# Patient Record
Sex: FEMALE | Race: WHITE | NOT HISPANIC OR LATINO | Employment: FULL TIME | URBAN - METROPOLITAN AREA
[De-identification: names, ages, dates, MRNs, and addresses within clinical notes are randomized per-mention and may not be internally consistent; named-entity substitution may affect disease eponyms.]

---

## 2018-08-28 ENCOUNTER — LAB (OUTPATIENT)
Dept: LAB | Facility: HOSPITAL | Age: 28
End: 2018-08-28

## 2018-08-28 ENCOUNTER — TRANSCRIBE ORDERS (OUTPATIENT)
Dept: LAB | Facility: HOSPITAL | Age: 28
End: 2018-08-28

## 2018-08-28 DIAGNOSIS — O03.9 THREATENED ABORTION, DELIVERED: ICD-10-CM

## 2018-08-28 DIAGNOSIS — O03.9 THREATENED ABORTION, DELIVERED: Primary | ICD-10-CM

## 2018-08-28 LAB
ABO GROUP BLD: NORMAL
BLD GP AB SCN SERPL QL: NEGATIVE
DEPRECATED RDW RBC AUTO: 37.4 FL (ref 37–54)
ERYTHROCYTE [DISTWIDTH] IN BLOOD BY AUTOMATED COUNT: 12.6 % (ref 11.3–14.5)
HCG INTACT+B SERPL-ACNC: NORMAL MIU/ML
HCT VFR BLD AUTO: 37.6 % (ref 34.5–44)
HGB BLD-MCNC: 12.9 G/DL (ref 11.5–15.5)
MCH RBC QN AUTO: 28.2 PG (ref 27–31)
MCHC RBC AUTO-ENTMCNC: 34.3 G/DL (ref 32–36)
MCV RBC AUTO: 82.1 FL (ref 80–99)
PLATELET # BLD AUTO: 231 10*3/MM3 (ref 150–450)
PMV BLD AUTO: 9.5 FL (ref 6–12)
RBC # BLD AUTO: 4.58 10*6/MM3 (ref 3.89–5.14)
RH BLD: NEGATIVE
WBC NRBC COR # BLD: 7.7 10*3/MM3 (ref 3.5–10.8)

## 2018-08-28 PROCEDURE — 86900 BLOOD TYPING SEROLOGIC ABO: CPT

## 2018-08-28 PROCEDURE — 36415 COLL VENOUS BLD VENIPUNCTURE: CPT

## 2018-08-28 PROCEDURE — 84702 CHORIONIC GONADOTROPIN TEST: CPT

## 2018-08-28 PROCEDURE — 85027 COMPLETE CBC AUTOMATED: CPT

## 2018-08-28 PROCEDURE — 86901 BLOOD TYPING SEROLOGIC RH(D): CPT

## 2018-08-28 PROCEDURE — 86850 RBC ANTIBODY SCREEN: CPT

## 2018-09-14 ENCOUNTER — TRANSCRIBE ORDERS (OUTPATIENT)
Dept: LAB | Facility: HOSPITAL | Age: 28
End: 2018-09-14

## 2018-09-14 ENCOUNTER — LAB (OUTPATIENT)
Dept: LAB | Facility: HOSPITAL | Age: 28
End: 2018-09-14

## 2018-09-14 DIAGNOSIS — Z3A.08 8 WEEKS GESTATION OF PREGNANCY: ICD-10-CM

## 2018-09-14 DIAGNOSIS — Z3A.08 8 WEEKS GESTATION OF PREGNANCY: Primary | ICD-10-CM

## 2018-09-14 DIAGNOSIS — Z34.81 PRENATAL CARE, SUBSEQUENT PREGNANCY, FIRST TRIMESTER: ICD-10-CM

## 2018-09-14 LAB
ABO GROUP BLD: NORMAL
BASOPHILS # BLD AUTO: 0.01 10*3/MM3 (ref 0–0.2)
BASOPHILS NFR BLD AUTO: 0.2 % (ref 0–1)
BLD GP AB SCN SERPL QL: NEGATIVE
DEPRECATED RDW RBC AUTO: 37.8 FL (ref 37–54)
EOSINOPHIL # BLD AUTO: 0.04 10*3/MM3 (ref 0–0.3)
EOSINOPHIL NFR BLD AUTO: 0.6 % (ref 0–3)
ERYTHROCYTE [DISTWIDTH] IN BLOOD BY AUTOMATED COUNT: 12.6 % (ref 11.3–14.5)
GLUCOSE BLD-MCNC: 66 MG/DL (ref 70–100)
HBV SURFACE AG SERPL QL IA: NORMAL
HCT VFR BLD AUTO: 38.5 % (ref 34.5–44)
HCV AB SER DONR QL: NORMAL
HGB BLD-MCNC: 13.2 G/DL (ref 11.5–15.5)
HIV1+2 AB SER QL: NORMAL
IMM GRANULOCYTES # BLD: 0.01 10*3/MM3 (ref 0–0.03)
IMM GRANULOCYTES NFR BLD: 0.2 % (ref 0–0.6)
LYMPHOCYTES # BLD AUTO: 1.57 10*3/MM3 (ref 0.6–4.8)
LYMPHOCYTES NFR BLD AUTO: 24.1 % (ref 24–44)
MCH RBC QN AUTO: 28.4 PG (ref 27–31)
MCHC RBC AUTO-ENTMCNC: 34.3 G/DL (ref 32–36)
MCV RBC AUTO: 83 FL (ref 80–99)
MONOCYTES # BLD AUTO: 0.58 10*3/MM3 (ref 0–1)
MONOCYTES NFR BLD AUTO: 8.9 % (ref 0–12)
NEUTROPHILS # BLD AUTO: 4.32 10*3/MM3 (ref 1.5–8.3)
NEUTROPHILS NFR BLD AUTO: 66.2 % (ref 41–71)
PLATELET # BLD AUTO: 222 10*3/MM3 (ref 150–450)
PMV BLD AUTO: 10 FL (ref 6–12)
RBC # BLD AUTO: 4.64 10*6/MM3 (ref 3.89–5.14)
RH BLD: NEGATIVE
RUBV IGG SER QL: NORMAL
RUBV IGG SER-ACNC: 16.6 IU/ML
T4 FREE SERPL-MCNC: 1.23 NG/DL (ref 0.89–1.76)
TSH SERPL DL<=0.05 MIU/L-ACNC: 0.12 MIU/ML (ref 0.35–5.35)
WBC NRBC COR # BLD: 6.52 10*3/MM3 (ref 3.5–10.8)

## 2018-09-14 PROCEDURE — 84439 ASSAY OF FREE THYROXINE: CPT

## 2018-09-14 PROCEDURE — 86803 HEPATITIS C AB TEST: CPT

## 2018-09-14 PROCEDURE — 85025 COMPLETE CBC W/AUTO DIFF WBC: CPT

## 2018-09-14 PROCEDURE — 86850 RBC ANTIBODY SCREEN: CPT

## 2018-09-14 PROCEDURE — 87340 HEPATITIS B SURFACE AG IA: CPT

## 2018-09-14 PROCEDURE — 82947 ASSAY GLUCOSE BLOOD QUANT: CPT

## 2018-09-14 PROCEDURE — 86900 BLOOD TYPING SEROLOGIC ABO: CPT

## 2018-09-14 PROCEDURE — 80081 OBSTETRIC PANEL INC HIV TSTG: CPT

## 2018-09-14 PROCEDURE — 86762 RUBELLA ANTIBODY: CPT

## 2018-09-14 PROCEDURE — G0432 EIA HIV-1/HIV-2 SCREEN: HCPCS

## 2018-09-14 PROCEDURE — 84443 ASSAY THYROID STIM HORMONE: CPT

## 2018-09-14 PROCEDURE — 86901 BLOOD TYPING SEROLOGIC RH(D): CPT

## 2018-09-14 PROCEDURE — 36415 COLL VENOUS BLD VENIPUNCTURE: CPT

## 2018-09-17 LAB — RPR SER QL: NORMAL

## 2018-11-09 ENCOUNTER — TRANSCRIBE ORDERS (OUTPATIENT)
Dept: LAB | Facility: HOSPITAL | Age: 28
End: 2018-11-09

## 2018-11-09 ENCOUNTER — LAB (OUTPATIENT)
Dept: LAB | Facility: HOSPITAL | Age: 28
End: 2018-11-09

## 2018-11-09 DIAGNOSIS — Z3A.17 17 WEEKS GESTATION OF PREGNANCY: Primary | ICD-10-CM

## 2018-11-09 DIAGNOSIS — Z3A.17 17 WEEKS GESTATION OF PREGNANCY: ICD-10-CM

## 2018-11-09 LAB — TSH SERPL DL<=0.05 MIU/L-ACNC: 1.08 MIU/ML (ref 0.35–5.35)

## 2018-11-09 PROCEDURE — 84443 ASSAY THYROID STIM HORMONE: CPT

## 2018-11-09 PROCEDURE — 36415 COLL VENOUS BLD VENIPUNCTURE: CPT

## 2019-02-01 ENCOUNTER — TRANSCRIBE ORDERS (OUTPATIENT)
Dept: LAB | Facility: HOSPITAL | Age: 29
End: 2019-02-01

## 2019-02-01 ENCOUNTER — LAB (OUTPATIENT)
Dept: LAB | Facility: HOSPITAL | Age: 29
End: 2019-02-01

## 2019-02-01 DIAGNOSIS — Z34.83 PRENATAL CARE, SUBSEQUENT PREGNANCY, THIRD TRIMESTER: ICD-10-CM

## 2019-02-01 DIAGNOSIS — Z3A.28 28 WEEKS GESTATION OF PREGNANCY: Primary | ICD-10-CM

## 2019-02-01 DIAGNOSIS — Z3A.28 28 WEEKS GESTATION OF PREGNANCY: ICD-10-CM

## 2019-02-01 LAB
ABO GROUP BLD: NORMAL
BLD GP AB SCN SERPL QL: NEGATIVE
DEPRECATED RDW RBC AUTO: 43.6 FL (ref 37–54)
ERYTHROCYTE [DISTWIDTH] IN BLOOD BY AUTOMATED COUNT: 13.5 % (ref 11.3–14.5)
EXTERNAL GLUCOSE TOLERANCE TEST FASTING: 96 MG/DL
GLUCOSE 1H P 100 G GLC PO SERPL-MCNC: 96 MG/DL (ref 65–140)
HCT VFR BLD AUTO: 38.1 % (ref 34.5–44)
HGB BLD-MCNC: 12.9 G/DL (ref 11.5–15.5)
MCH RBC QN AUTO: 30.1 PG (ref 27–31)
MCHC RBC AUTO-ENTMCNC: 33.9 G/DL (ref 32–36)
MCV RBC AUTO: 89 FL (ref 80–99)
PLATELET # BLD AUTO: 195 10*3/MM3 (ref 150–450)
PMV BLD AUTO: 9.8 FL (ref 6–12)
RBC # BLD AUTO: 4.28 10*6/MM3 (ref 3.89–5.14)
RH BLD: NEGATIVE
WBC NRBC COR # BLD: 8.55 10*3/MM3 (ref 3.5–10.8)

## 2019-02-01 PROCEDURE — 86850 RBC ANTIBODY SCREEN: CPT

## 2019-02-01 PROCEDURE — 85027 COMPLETE CBC AUTOMATED: CPT

## 2019-02-01 PROCEDURE — 86900 BLOOD TYPING SEROLOGIC ABO: CPT

## 2019-02-01 PROCEDURE — 82950 GLUCOSE TEST: CPT | Performed by: NURSE PRACTITIONER

## 2019-02-01 PROCEDURE — 86901 BLOOD TYPING SEROLOGIC RH(D): CPT

## 2019-02-01 PROCEDURE — 36415 COLL VENOUS BLD VENIPUNCTURE: CPT | Performed by: NURSE PRACTITIONER

## 2019-04-18 ENCOUNTER — HOSPITAL ENCOUNTER (INPATIENT)
Facility: HOSPITAL | Age: 29
LOS: 3 days | Discharge: HOME | End: 2019-04-21
Attending: NURSE PRACTITIONER | Admitting: OBSTETRICS & GYNECOLOGY

## 2019-04-18 PROBLEM — Z3A.39 PREGNANCY WITH 39 COMPLETED WEEKS GESTATION: Status: ACTIVE | Noted: 2019-04-18

## 2019-04-18 LAB
ABO GROUP BLD: NORMAL
BLD GP AB SCN SERPL QL: NEGATIVE
DEPRECATED RDW RBC AUTO: 38.7 FL (ref 37–54)
ERYTHROCYTE [DISTWIDTH] IN BLOOD BY AUTOMATED COUNT: 13 % (ref 12.3–15.4)
HCT VFR BLD AUTO: 36.1 % (ref 34–46.6)
HGB BLD-MCNC: 12.1 G/DL (ref 12–15.9)
MCH RBC QN AUTO: 28.3 PG (ref 26.6–33)
MCHC RBC AUTO-ENTMCNC: 33.5 G/DL (ref 31.5–35.7)
MCV RBC AUTO: 84.5 FL (ref 79–97)
PLATELET # BLD AUTO: 210 10*3/MM3 (ref 140–450)
PMV BLD AUTO: 10.4 FL (ref 6–12)
RBC # BLD AUTO: 4.27 10*6/MM3 (ref 3.77–5.28)
RH BLD: NEGATIVE
T&S EXPIRATION DATE: NORMAL
WBC NRBC COR # BLD: 9.99 10*3/MM3 (ref 3.4–10.8)

## 2019-04-18 PROCEDURE — 86901 BLOOD TYPING SEROLOGIC RH(D): CPT | Performed by: ADVANCED PRACTICE MIDWIFE

## 2019-04-18 PROCEDURE — 85027 COMPLETE CBC AUTOMATED: CPT | Performed by: ADVANCED PRACTICE MIDWIFE

## 2019-04-18 PROCEDURE — 86900 BLOOD TYPING SEROLOGIC ABO: CPT | Performed by: ADVANCED PRACTICE MIDWIFE

## 2019-04-18 PROCEDURE — 59025 FETAL NON-STRESS TEST: CPT

## 2019-04-18 PROCEDURE — 86850 RBC ANTIBODY SCREEN: CPT | Performed by: ADVANCED PRACTICE MIDWIFE

## 2019-04-18 RX ORDER — OXYTOCIN-SODIUM CHLORIDE 0.9% IV SOLN 30 UNIT/500ML 30-0.9/5 UT/ML-%
2-30 SOLUTION INTRAVENOUS
Status: DISCONTINUED | OUTPATIENT
Start: 2019-04-18 | End: 2019-04-19 | Stop reason: HOSPADM

## 2019-04-18 RX ORDER — MAGNESIUM CARB/ALUMINUM HYDROX 105-160MG
30 TABLET,CHEWABLE ORAL ONCE
Status: COMPLETED | OUTPATIENT
Start: 2019-04-18 | End: 2019-04-19

## 2019-04-18 RX ORDER — SODIUM CHLORIDE 0.9 % (FLUSH) 0.9 %
1-10 SYRINGE (ML) INJECTION AS NEEDED
Status: DISCONTINUED | OUTPATIENT
Start: 2019-04-18 | End: 2019-04-19 | Stop reason: HOSPADM

## 2019-04-18 RX ORDER — BUTORPHANOL TARTRATE 1 MG/ML
1 INJECTION, SOLUTION INTRAMUSCULAR; INTRAVENOUS
Status: DISCONTINUED | OUTPATIENT
Start: 2019-04-18 | End: 2019-04-19 | Stop reason: HOSPADM

## 2019-04-18 RX ORDER — SODIUM CHLORIDE 0.9 % (FLUSH) 0.9 %
3 SYRINGE (ML) INJECTION EVERY 12 HOURS SCHEDULED
Status: DISCONTINUED | OUTPATIENT
Start: 2019-04-18 | End: 2019-04-19 | Stop reason: HOSPADM

## 2019-04-18 RX ORDER — SODIUM CHLORIDE, SODIUM LACTATE, POTASSIUM CHLORIDE, CALCIUM CHLORIDE 600; 310; 30; 20 MG/100ML; MG/100ML; MG/100ML; MG/100ML
125 INJECTION, SOLUTION INTRAVENOUS CONTINUOUS
Status: DISCONTINUED | OUTPATIENT
Start: 2019-04-18 | End: 2019-04-19

## 2019-04-18 RX ORDER — LIDOCAINE HYDROCHLORIDE 10 MG/ML
5 INJECTION, SOLUTION EPIDURAL; INFILTRATION; INTRACAUDAL; PERINEURAL AS NEEDED
Status: DISCONTINUED | OUTPATIENT
Start: 2019-04-18 | End: 2019-04-19 | Stop reason: HOSPADM

## 2019-04-18 NOTE — H&P
[facility]  Obstetric History and Physical    Chief Complaint   Patient presents with   • Contractions       Subjective     Patient is a 28 y.o. female  currently at 39w4d, who presents with reports of painful contractions. Contractions started about 1130 and became regular and uncomfortable around 1430.  Denies ROM or vaginal bleeding.  Baby active.  Complains of suprapubic pressure.     Her prenatal care is benign.  Her previous obstetric/gynecological history is noted for previous uncomplicated vaginal delivery.    The following portions of the patients history were reviewed and updated as appropriate: current medications, allergies, past medical history, past surgical history, past family history, past social history and problem list .       Prenatal Information:   External Prenatal Results     Pregnancy Outside Results - Transcribed From Office Records - See Scanned Records For Details     Test Value Date Time    Hgb 12.9 g/dL 19 1008    Hct 38.1 % 19 1008    ABO A  19 1008    Rh Negative  19 1008    Antibody Screen Negative  19 1008    Glucose Fasting GTT       Glucose Tolerance Test 1 hour 96      Glucose Tolerance Test 3 hour       Gonorrhea (discrete)       Chlamydia (discrete)       RPR Non-Reactive  18 1203    VDRL       Syphilis Antibody       Rubella 16.6 IU/mL 18 1203      Immune  18 1203    HBsAg Non-Reactive  18 1203    Herpes Simplex Virus PCR       Herpes Simplex VIrus Culture       HIV Non-Reactive  18 1203    Hep C RNA Quant PCR       Hep C Antibody Non-Reactive  18 1203    AFP       Group B Strep       GBS Susceptibility to Clindamycin       GBS Susceptibility to Erythromycin       Fetal Fibronectin       Genetic Testing, Maternal Blood             Drug Screening     Test Value Date Time    Urine Drug Screen       Amphetamine Screen       Barbiturate Screen       Benzodiazepine Screen       Methadone Screen        Phencyclidine Screen       Opiates Screen       THC Screen       Cocaine Screen       Propoxyphene Screen       Buprenorphine Screen       Methamphetamine Screen       Oxycodone Screen       Tricyclic Antidepressants Screen                     Past OB History:       Obstetric History       T0      L1     SAB0   TAB0   Ectopic0   Molar0   Multiple0   Live Births0       # Outcome Date GA Lbr Rikki/2nd Weight Sex Delivery Anes PTL Lv   2 Current            1 Para                   Past Medical History: Past Medical History:   Diagnosis Date   • Anxiety    • Hypoglycemia       Past Surgical History Past Surgical History:   Procedure Laterality Date   • CYST REMOVAL     • WISDOM TOOTH EXTRACTION        Family History: History reviewed. No pertinent family history.   Social History:  reports that she has never smoked. She has never used smokeless tobacco.   reports that she does not drink alcohol.   reports that she does not use drugs.        General ROS: Pertinent items are noted in HPI, all other systems reviewed and negative    Objective     Vitals:    19 1540   BP: 120/68   Pulse: 77   Resp: 18   Temp: 98.1 °F (36.7 °C)     Weight: Weight:  [76.2 kg (168 lb)] 76.2 kg (168 lb)     Physical Examination:   General Appearance: alert, well appearing, in no apparent distress, in mild to moderate distress  Lungs: clear to auscultation, no wheezes, rales or rhonchi, symmetric air entry  Heart: regular rate and rhythm, no murmurs  Abdomen: Gravid uterus, Soft between contractions, size consistent with dates, +FM.  Back exam: no CVA tenderness   Extremities: no redness or tenderness in the calves or thighs, no edema  Skin: normal coloration and turgor, no rashes      Presentation: Vertex   Cervix: Exam by: Method: sterile exam per Muriel Ely CNM   Dilation: Cervical Dilation (cm): 3cm  Mid to posterior.   Effacement: Cervical Effacement: 80%   Station:  Membranes: Fetal Station: -1  Bulging          Fetal  Heart Rate Assessment   Method: Fetal HR Assessment Method: external   Beats/min: Fetal HR (beats/min): 135   Baseline: Fetal Heart Baseline Rate: normal range   Varibility: Fetal HR Variability: moderate (amplitude range 6 to 25 bpm)   Accels: Fetal HR Accelerations: greater than/equal to 15 bpm, lasting at least 15 seconds   Decels: Fetal HR Decelerations: absent   Tracing Category:       Uterine Assessment   Method: Method: external tocotransducer   Frequency (min): Contraction Frequency (Minutes): 2-2.5 minutes   Ctx Count in 10 min:     Duration:  60-90 seconds   Intensity:  palpate moderate to strong   Intensity by IUPC:     Resting Tone:  palpates soft   Resting Tone by IUPC:     [place] Units:       Laboratory Results: Pending          Pregnancy with 39 completed weeks gestation    Normal labor and delivery        Assessment:  1.  Intrauterine pregnancy at 39w4d weeks gestation with reactive, reassuring fetal status.    2.  labor  without ROM  3.  Obstetrical history significant for previous uncomplicated vaginal delivery at term.  4.  GBS status: Negative    Plan:  1. fetal and uterine monitoring  intermittent, expectant management and analgesia with  parenteral narcotics and epidural if requested  2. Plan of care has been reviewed with patient and partner  3. All questions have been answered.      Muriel Eyl CNM  4/18/2019  5:32 PM

## 2019-04-19 PROBLEM — Z3A.39 PREGNANCY WITH 39 COMPLETED WEEKS GESTATION: Status: RESOLVED | Noted: 2019-04-18 | Resolved: 2019-04-19

## 2019-04-19 LAB
ABO GROUP BLD: NORMAL
FETAL BLEED: NEGATIVE
NUMBER OF DOSES: NORMAL
RH BLD: NEGATIVE

## 2019-04-19 PROCEDURE — 25010000002 RHO D IMMUNE GLOBULIN 1500 UNIT/2ML SOLUTION PREFILLED SYRINGE: Performed by: NURSE PRACTITIONER

## 2019-04-19 PROCEDURE — 86900 BLOOD TYPING SEROLOGIC ABO: CPT | Performed by: NURSE PRACTITIONER

## 2019-04-19 PROCEDURE — 4A1HX4Z MONITORING OF PRODUCTS OF CONCEPTION, CARDIAC ELECTRICAL ACTIVITY, EXTERNAL APPROACH: ICD-10-PCS | Performed by: NURSE PRACTITIONER

## 2019-04-19 PROCEDURE — 85461 HEMOGLOBIN FETAL: CPT | Performed by: NURSE PRACTITIONER

## 2019-04-19 PROCEDURE — 86901 BLOOD TYPING SEROLOGIC RH(D): CPT | Performed by: NURSE PRACTITIONER

## 2019-04-19 PROCEDURE — 59025 FETAL NON-STRESS TEST: CPT

## 2019-04-19 PROCEDURE — 10907ZC DRAINAGE OF AMNIOTIC FLUID, THERAPEUTIC FROM PRODUCTS OF CONCEPTION, VIA NATURAL OR ARTIFICIAL OPENING: ICD-10-PCS | Performed by: NURSE PRACTITIONER

## 2019-04-19 RX ORDER — MISOPROSTOL 200 UG/1
800 TABLET ORAL AS NEEDED
Status: DISCONTINUED | OUTPATIENT
Start: 2019-04-19 | End: 2019-04-19 | Stop reason: HOSPADM

## 2019-04-19 RX ORDER — IBUPROFEN 600 MG/1
600 TABLET ORAL EVERY 6 HOURS PRN
Status: DISCONTINUED | OUTPATIENT
Start: 2019-04-19 | End: 2019-04-19 | Stop reason: HOSPADM

## 2019-04-19 RX ORDER — LANOLIN 100 %
OINTMENT (GRAM) TOPICAL
Status: DISCONTINUED | OUTPATIENT
Start: 2019-04-19 | End: 2019-04-21 | Stop reason: HOSPADM

## 2019-04-19 RX ORDER — OXYTOCIN-SODIUM CHLORIDE 0.9% IV SOLN 30 UNIT/500ML 30-0.9/5 UT/ML-%
85 SOLUTION INTRAVENOUS ONCE
Status: DISCONTINUED | OUTPATIENT
Start: 2019-04-19 | End: 2019-04-19 | Stop reason: HOSPADM

## 2019-04-19 RX ORDER — IBUPROFEN 600 MG/1
600 TABLET ORAL EVERY 6 HOURS PRN
Status: DISCONTINUED | OUTPATIENT
Start: 2019-04-19 | End: 2019-04-21 | Stop reason: HOSPADM

## 2019-04-19 RX ORDER — CARBOPROST TROMETHAMINE 250 UG/ML
250 INJECTION, SOLUTION INTRAMUSCULAR AS NEEDED
Status: DISCONTINUED | OUTPATIENT
Start: 2019-04-19 | End: 2019-04-19 | Stop reason: HOSPADM

## 2019-04-19 RX ORDER — OXYTOCIN-SODIUM CHLORIDE 0.9% IV SOLN 30 UNIT/500ML 30-0.9/5 UT/ML-%
650 SOLUTION INTRAVENOUS ONCE
Status: DISCONTINUED | OUTPATIENT
Start: 2019-04-19 | End: 2019-04-19 | Stop reason: HOSPADM

## 2019-04-19 RX ORDER — PRENATAL VIT/IRON FUM/FOLIC AC 27MG-0.8MG
1 TABLET ORAL DAILY
Status: DISCONTINUED | OUTPATIENT
Start: 2019-04-19 | End: 2019-04-21 | Stop reason: HOSPADM

## 2019-04-19 RX ORDER — DOCUSATE SODIUM 100 MG/1
100 CAPSULE, LIQUID FILLED ORAL 2 TIMES DAILY
Status: DISCONTINUED | OUTPATIENT
Start: 2019-04-19 | End: 2019-04-21 | Stop reason: HOSPADM

## 2019-04-19 RX ORDER — METHYLERGONOVINE MALEATE 0.2 MG/ML
200 INJECTION INTRAVENOUS ONCE AS NEEDED
Status: DISCONTINUED | OUTPATIENT
Start: 2019-04-19 | End: 2019-04-19 | Stop reason: HOSPADM

## 2019-04-19 RX ADMIN — IBUPROFEN 600 MG: 600 TABLET ORAL at 09:01

## 2019-04-19 RX ADMIN — PRENATAL VIT W/ FE FUMARATE-FA TAB 27-0.8 MG 1 TABLET: 27-0.8 TAB at 09:01

## 2019-04-19 RX ADMIN — DOCUSATE SODIUM 100 MG: 100 CAPSULE, LIQUID FILLED ORAL at 20:58

## 2019-04-19 RX ADMIN — IBUPROFEN 600 MG: 600 TABLET, FILM COATED ORAL at 02:12

## 2019-04-19 RX ADMIN — DOCUSATE SODIUM 100 MG: 100 CAPSULE, LIQUID FILLED ORAL at 08:19

## 2019-04-19 RX ADMIN — HUMAN RHO(D) IMMUNE GLOBULIN 1500 UNITS: 1500 SOLUTION INTRAMUSCULAR; INTRAVENOUS at 13:51

## 2019-04-19 RX ADMIN — IBUPROFEN 600 MG: 600 TABLET ORAL at 20:58

## 2019-04-19 RX ADMIN — MINERAL OIL 30 ML: 471.95 OIL ORAL at 01:35

## 2019-04-19 NOTE — LACTATION NOTE
04/19/19 0845   Maternal Information   Person Making Referral other (see comments)  (Courtesy visit, Teaching done)   Equipment Type   Breast Pump Type other (see comments)  (Gave Rx and instructed how to get breast pump.)

## 2019-04-19 NOTE — L&D DELIVERY NOTE
[facility]  Vaginal Delivery Note    Delivery     Delivery: Vaginal, Spontaneous     YOB: 2019    Time of Birth:  Gestational Age 1:45 AM   39w5d     Anesthesia: None     Delivering clinician: Muriel Ely    Forceps?   No   Vacuum? No    Shoulder dystocia present: No        Delivery narrative:  Quita pushed effectively to spontaneous vaginal delivery of a viable female infant in occiput anterior, over intact perineum. Mouth and nose bulb suctioned after delivery of head. Shoulder and body delivered easily with next push.  Spontaneous cry.  Infant placed on mothers abdomen and dried.  Cord was double clamped at 2 minutes of life and cut by FOB.  Cord blood and cord segment obtained.  Spontaneous delivery of intact placenta with 3 vessel cord. Fundus firm. Lochia rubra scant. Perineal inspect reveal a small introital abrasion with hemostasis.  Mother and daughter stable in the immediate PP period.     Infant    Findings: female  infant     Infant observations: Weight: Not available at this time  Length:    in  Observations/Comments:         Apgars: 9   @ 1 minute /    9   @ 5 minutes   Infant Name: Lisbeth     Placenta, Cord, and Fluid    Placenta delivered  Spontaneous  at   4/19/2019  1:50 AM     Cord: 3 vessels  present.   Nuchal Cord?  no   Cord blood obtained: Yes    Cord gases obtained:  No        Repair    Episiotomy: None    Lacerations: No   Estimated Blood Loss:  100mls           Complications  none    Disposition  Mother to Mother Baby/Postpartum  in stable condition currently.  Baby to remains with mom  in stable condition currently.      Muriel Ely CNM  04/19/19  2:02 AM

## 2019-04-19 NOTE — PROGRESS NOTES
"04/18/19  9:04 PM  Quita Millan      ASSESSMENTS: Coping well with contractions. Requesting amniotomy.    /61 (BP Location: Right arm, Patient Position: Lying)   Pulse 75   Temp 98.2 °F (36.8 °C) (Oral)   Resp 18   Ht 165.1 cm (65\")   Wt 76.2 kg (168 lb)   Breastfeeding? Yes   BMI 27.96 kg/m²     Fetal Heart Rate Assessment   Method: Fetal HR Assessment Method: external   Beats/min: Fetal HR (beats/min): 135   Baseline: Fetal Heart Baseline Rate: normal range   Varibility: Fetal HR Variability: moderate (amplitude range 6 to 25 bpm)   Accels: Fetal HR Accelerations: greater than/equal to 15 bpm, lasting at least 15 seconds   Decels: Fetal HR Decelerations: absent   Tracing Category: Fetal HR Tracing Category: (off for bathroom)     Uterine Assessment   Method: Method: external tocotransducer   Frequency (min): Contraction Frequency (Minutes): 3-4   Ctx Count in 10 min:     Duration:     Intensity:     Intensity by IUPC:     Resting Tone:     Resting Tone by IUPC:     [place] Units:                                Presentation: Vertex   Cervix: Exam by: Method: sterile exam per GUANAKITO(Muriel Ely)   Dilation: Cervical Dilation (cm): 4   Effacement: Cervical Effacement: 80%   Station:  Membranes: Fetal Station: -2   AROM fluid clear            Lab Results   Component Value Date    WBC 9.99 04/18/2019    HGB 12.1 04/18/2019    HCT 36.1 04/18/2019    MCV 84.5 04/18/2019     04/18/2019    AST 22 06/28/2016    ALT 20 06/28/2016     Results from last 7 days   Lab Units 04/18/19  1746   ABO TYPING  A   RH TYPING  Negative   ANTIBODY SCREEN  Negative       PLAN: Continue present management.    Muriel Ely CNM  9:04 PM  04/18/19  "

## 2019-04-19 NOTE — PLAN OF CARE
Problem: Patient Care Overview  Goal: Plan of Care Review  Outcome: Ongoing (interventions implemented as appropriate)   04/19/19 0647   Coping/Psychosocial   Plan of Care Reviewed With patient   Plan of Care Review   Progress improving   OTHER   Outcome Summary VSS, lochia rubra lt. amt. check void.     Goal: Individualization and Mutuality  Outcome: Ongoing (interventions implemented as appropriate)    Goal: Discharge Needs Assessment  Outcome: Ongoing (interventions implemented as appropriate)   04/19/19 0647   Discharge Needs Assessment   Patient/Family Anticipates Transition to home with family     Goal: Interprofessional Rounds/Family Conf  Outcome: Ongoing (interventions implemented as appropriate)   04/19/19 0647   Interdisciplinary Rounds/Family Conf   Participants family;nursing;physician       Problem: Labor (Cervical Ripen, Induct, Augment) (Adult,Obstetrics,Pediatric)  Goal: Signs and Symptoms of Listed Potential Problems Will be Absent, Minimized or Managed (Labor)  Outcome: Outcome(s) achieved Date Met: 04/19/19      Problem: Postpartum (Vaginal Delivery) (Adult,Obstetrics,Pediatric)  Goal: Signs and Symptoms of Listed Potential Problems Will be Absent, Minimized or Managed (Postpartum)  Outcome: Ongoing (interventions implemented as appropriate)   04/19/19 0647   Goal/Outcome Evaluation   Problems Present (Postpartum Vag Deliv) none       Problem: Breastfeeding (Adult,Obstetrics,Pediatric)  Goal: Signs and Symptoms of Listed Potential Problems Will be Absent, Minimized or Managed (Breastfeeding)  Outcome: Ongoing (interventions implemented as appropriate)   04/19/19 0647   Goal/Outcome Evaluation   Problems Present (Breastfeeding) none

## 2019-04-19 NOTE — PLAN OF CARE
Problem: Patient Care Overview  Goal: Plan of Care Review  Outcome: Ongoing (interventions implemented as appropriate)   04/19/19 2839   Coping/Psychosocial   Plan of Care Reviewed With patient;significant other   OTHER   Outcome Summary Instructed patient to feed baby when feeding cues are seen and not let baby go longer than 3 hrs before waking baby and attempting to nurse. Gave Rx for breast pump.

## 2019-04-19 NOTE — PROGRESS NOTES
[facility]  Vaginal Delivery Progress Note    Subjective     Doing well, pain controlled, lochia less than menses      Objective     Vital Signs Range for the last 24 hours  Temperature: Temp:  [98.1 °F (36.7 °C)-98.5 °F (36.9 °C)] 98.1 °F (36.7 °C)   Temp Source: Temp src: Oral   BP: BP: (101-133)/(59-76) 107/61   Pulse: Heart Rate:  [65-94] 74   Respirations: Resp:  [16-20] 16   SPO2:     O2 Amount (l/min):     O2 Devices           Physical Exam:  General:  no acute distresss.  Abdomen: Soft, non-tender, fundus firm  Extremities: normal, atraumatic, no cyanosis, and trace edema.       Lab results reviewed:  Yes    Lab Results   Component Value Date    WBC 9.99 04/18/2019    HGB 12.1 04/18/2019    HCT 36.1 04/18/2019    MCV 84.5 04/18/2019     04/18/2019         Assessment/Plan       Normal spontaneous vaginal delivery      Quita Millan is Day 0  post-partum       Plan:  Continue current care.      Reny Casillas CNM  4/19/2019  8:51 AM

## 2019-04-20 LAB
BASOPHILS # BLD AUTO: 0.03 10*3/MM3 (ref 0–0.2)
BASOPHILS NFR BLD AUTO: 0.3 % (ref 0–1.5)
DEPRECATED RDW RBC AUTO: 41.1 FL (ref 37–54)
EOSINOPHIL # BLD AUTO: 0.2 10*3/MM3 (ref 0–0.4)
EOSINOPHIL NFR BLD AUTO: 1.9 % (ref 0.3–6.2)
ERYTHROCYTE [DISTWIDTH] IN BLOOD BY AUTOMATED COUNT: 13.2 % (ref 12.3–15.4)
HCT VFR BLD AUTO: 34.1 % (ref 34–46.6)
HGB BLD-MCNC: 11.1 G/DL (ref 12–15.9)
IMM GRANULOCYTES # BLD AUTO: 0.06 10*3/MM3 (ref 0–0.05)
IMM GRANULOCYTES NFR BLD AUTO: 0.6 % (ref 0–0.5)
LYMPHOCYTES # BLD AUTO: 2.13 10*3/MM3 (ref 0.7–3.1)
LYMPHOCYTES NFR BLD AUTO: 20.2 % (ref 19.6–45.3)
MCH RBC QN AUTO: 28 PG (ref 26.6–33)
MCHC RBC AUTO-ENTMCNC: 32.6 G/DL (ref 31.5–35.7)
MCV RBC AUTO: 86.1 FL (ref 79–97)
MONOCYTES # BLD AUTO: 0.64 10*3/MM3 (ref 0.1–0.9)
MONOCYTES NFR BLD AUTO: 6.1 % (ref 5–12)
NEUTROPHILS # BLD AUTO: 7.54 10*3/MM3 (ref 1.7–7)
NEUTROPHILS NFR BLD AUTO: 71.5 % (ref 42.7–76)
PLATELET # BLD AUTO: 173 10*3/MM3 (ref 140–450)
PMV BLD AUTO: 10.2 FL (ref 6–12)
RBC # BLD AUTO: 3.96 10*6/MM3 (ref 3.77–5.28)
WBC NRBC COR # BLD: 10.54 10*3/MM3 (ref 3.4–10.8)

## 2019-04-20 PROCEDURE — 85025 COMPLETE CBC W/AUTO DIFF WBC: CPT | Performed by: ADVANCED PRACTICE MIDWIFE

## 2019-04-20 RX ADMIN — IBUPROFEN 600 MG: 600 TABLET ORAL at 11:04

## 2019-04-20 RX ADMIN — Medication: at 07:36

## 2019-04-20 RX ADMIN — DOCUSATE SODIUM 100 MG: 100 CAPSULE, LIQUID FILLED ORAL at 08:56

## 2019-04-20 RX ADMIN — IBUPROFEN 600 MG: 600 TABLET ORAL at 18:57

## 2019-04-20 NOTE — PROGRESS NOTES
[facility]  Vaginal Delivery Progress Note    Subjective     Doing well, pain controlled, lochia less than menses      Objective     Vital Signs Range for the last 24 hours  Temperature: Temp:  [98.1 °F (36.7 °C)-98.3 °F (36.8 °C)] 98.1 °F (36.7 °C)   Temp Source: Temp src: Oral   BP: BP: (106-108)/(58-59) 106/58   Pulse: Heart Rate:  [69-75] 69   Respirations: Resp:  [16] 16   SPO2:     O2 Amount (l/min):     O2 Devices           Physical Exam:  General:  no acute distresss.  Abdomen: Soft, non-tender, fundus firm  Extremities: normal, atraumatic, no cyanosis, and trace edema.       Lab results reviewed:  Yes    Lab Results   Component Value Date    WBC 10.54 04/20/2019    HGB 11.1 (L) 04/20/2019    HCT 34.1 04/20/2019    MCV 86.1 04/20/2019     04/20/2019         Assessment/Plan       Normal spontaneous vaginal delivery      Quita Millan is Day 1  post-partum       Plan:  Continue current care.      Reny Casillas CNM  4/20/2019  10:44 AM

## 2019-04-20 NOTE — LACTATION NOTE
Mom reports some bleeding to her right nipple overnight.  No bleeding or damage noted at this time.  Shield and shells given.  Encouraged mom to call for a latch check today.

## 2019-04-20 NOTE — PLAN OF CARE
Problem: Patient Care Overview  Goal: Plan of Care Review  Outcome: Ongoing (interventions implemented as appropriate)   04/20/19 0330   Coping/Psychosocial   Plan of Care Reviewed With patient   Plan of Care Review   Progress improving   OTHER   Outcome Summary VSS, lochia WDL, voiding, pain well controlled     Goal: Individualization and Mutuality  Outcome: Ongoing (interventions implemented as appropriate)   04/20/19 0330   Individualization   Patient Specific Preferences Breastfeeding   Patient Specific Goals (Include Timeframe) Maintian pain at or below a 4 on the numeric pain scale   Patient Specific Interventions Assess pain frequently and offer interventions prn     Goal: Discharge Needs Assessment  Outcome: Ongoing (interventions implemented as appropriate)   04/20/19 0330   Discharge Needs Assessment   Concerns to be Addressed no discharge needs identified   Patient/Family Anticipates Transition to home   Patient/Family Anticipated Services at Transition none   Transportation Concerns car, none   Transportation Anticipated family or friend will provide       Problem: Postpartum (Vaginal Delivery) (Adult,Obstetrics,Pediatric)  Goal: Signs and Symptoms of Listed Potential Problems Will be Absent, Minimized or Managed (Postpartum)  Outcome: Ongoing (interventions implemented as appropriate)   04/20/19 0330   Goal/Outcome Evaluation   Problems Assessed (Postpartum Vaginal Delivery) all   Problems Present (Postpartum Vag Deliv) none

## 2019-04-21 VITALS
WEIGHT: 168 LBS | TEMPERATURE: 97.9 F | SYSTOLIC BLOOD PRESSURE: 107 MMHG | BODY MASS INDEX: 27.99 KG/M2 | HEIGHT: 65 IN | HEART RATE: 74 BPM | RESPIRATION RATE: 18 BRPM | DIASTOLIC BLOOD PRESSURE: 58 MMHG

## 2019-04-21 RX ORDER — IBUPROFEN 800 MG/1
800 TABLET ORAL EVERY 6 HOURS PRN
Qty: 30 TABLET | Refills: 1 | Status: SHIPPED | OUTPATIENT
Start: 2019-04-21 | End: 2019-05-21

## 2019-04-21 RX ADMIN — IBUPROFEN 600 MG: 600 TABLET ORAL at 03:47

## 2019-04-21 RX ADMIN — PRENATAL VIT W/ FE FUMARATE-FA TAB 27-0.8 MG 1 TABLET: 27-0.8 TAB at 08:26

## 2019-04-21 RX ADMIN — DOCUSATE SODIUM 100 MG: 100 CAPSULE, LIQUID FILLED ORAL at 08:26

## 2019-04-21 NOTE — DISCHARGE SUMMARY
[facility]  Vaginal delivery discharge summary      Patient: Quita Millan      MR#:[id number]  Admission  Diagnosis:   Patient Active Problem List   Diagnosis   • Anxiety   • Normal spontaneous vaginal delivery     Discharge Diagnosis: S/P vaginal delivery      Date of Admission: 4/18/2019  Date of Discharge:  4/21/2019    Procedures:  Vaginal, Spontaneous     4/19/2019    1:45 AM      Service:  Obstetrics    Hospital Course:  Patient underwent vaginal delivery and remained in the hospital for 3 days.  During that time she remained afebrile and hemodynamically stable.  On the day of discharge, she was eating, ambulating and voiding without difficulty.      Labs:    Lab Results   Component Value Date    WBC 10.54 04/20/2019    HGB 11.1 (L) 04/20/2019    HCT 34.1 04/20/2019    MCV 86.1 04/20/2019     04/20/2019    AST 22 06/28/2016    ALT 20 06/28/2016     Results from last 7 days   Lab Units 04/19/19  0951 04/18/19  1746   ABO TYPING  A A   RH TYPING  Negative Negative   ANTIBODY SCREEN   --  Negative       Discharge Medications     Discharge Medications      Patient Not Prescribed Medications Upon Discharge         Discharge Disposition:  To Home    Discharge Condition:  Stable    Discharge Diet: Regular    Activity at Discharge: Pelvic rest    Follow-up Appointments  Follow up with Reny Casillas.     Beulah Lennon DO  04/21/19  8:48 AM

## 2019-04-21 NOTE — PLAN OF CARE
Problem: Patient Care Overview  Goal: Plan of Care Review  Outcome: Ongoing (interventions implemented as appropriate)      Problem: Postpartum (Vaginal Delivery) (Adult,Obstetrics,Pediatric)  Goal: Signs and Symptoms of Listed Potential Problems Will be Absent, Minimized or Managed (Postpartum)  Outcome: Ongoing (interventions implemented as appropriate)   04/21/19 0608   Goal/Outcome Evaluation   Problems Assessed (Postpartum Vaginal Delivery) all   Problems Present (Postpartum Vag Deliv) none

## 2020-07-10 ENCOUNTER — TRANSCRIBE ORDERS (OUTPATIENT)
Dept: LAB | Facility: HOSPITAL | Age: 30
End: 2020-07-10

## 2020-07-10 ENCOUNTER — LAB (OUTPATIENT)
Dept: LAB | Facility: HOSPITAL | Age: 30
End: 2020-07-10

## 2020-07-10 DIAGNOSIS — N92.5 RETROGRADE MENSTRUATION: ICD-10-CM

## 2020-07-10 DIAGNOSIS — N92.5 RETROGRADE MENSTRUATION: Primary | ICD-10-CM

## 2020-07-10 PROCEDURE — 36415 COLL VENOUS BLD VENIPUNCTURE: CPT

## 2020-07-10 PROCEDURE — 84702 CHORIONIC GONADOTROPIN TEST: CPT

## 2020-07-10 PROCEDURE — 84144 ASSAY OF PROGESTERONE: CPT

## 2020-07-11 LAB
HCG INTACT+B SERPL-ACNC: NORMAL MIU/ML
PROGEST SERPL-MCNC: 7.9 NG/ML

## 2020-07-12 ENCOUNTER — LAB (OUTPATIENT)
Dept: LAB | Facility: HOSPITAL | Age: 30
End: 2020-07-12

## 2020-07-12 DIAGNOSIS — Z3A.28 28 WEEKS GESTATION OF PREGNANCY: ICD-10-CM

## 2020-07-12 DIAGNOSIS — Z34.81 PRENATAL CARE, SUBSEQUENT PREGNANCY, FIRST TRIMESTER: ICD-10-CM

## 2020-07-12 DIAGNOSIS — F41.9 ANXIETY: Primary | ICD-10-CM

## 2020-07-12 DIAGNOSIS — Z3A.08 8 WEEKS GESTATION OF PREGNANCY: ICD-10-CM

## 2020-07-12 LAB
HCG INTACT+B SERPL-ACNC: NORMAL MIU/ML
PROGEST SERPL-MCNC: 11 NG/ML

## 2020-07-12 PROCEDURE — 84702 CHORIONIC GONADOTROPIN TEST: CPT

## 2020-07-12 PROCEDURE — 84144 ASSAY OF PROGESTERONE: CPT

## 2020-07-12 PROCEDURE — 36415 COLL VENOUS BLD VENIPUNCTURE: CPT

## 2020-08-20 ENCOUNTER — LAB (OUTPATIENT)
Dept: LAB | Facility: HOSPITAL | Age: 30
End: 2020-08-20

## 2020-08-20 ENCOUNTER — TRANSCRIBE ORDERS (OUTPATIENT)
Dept: LAB | Facility: HOSPITAL | Age: 30
End: 2020-08-20

## 2020-08-20 DIAGNOSIS — Z3A.13 13 WEEKS GESTATION OF PREGNANCY: Primary | ICD-10-CM

## 2020-08-20 DIAGNOSIS — Z34.81 PRENATAL CARE, SUBSEQUENT PREGNANCY, FIRST TRIMESTER: ICD-10-CM

## 2020-08-20 LAB
ABO GROUP BLD: NORMAL
AMPHET+METHAMPHET UR QL: NEGATIVE
AMPHETAMINES UR QL: NEGATIVE
BARBITURATES UR QL SCN: NEGATIVE
BASOPHILS # BLD AUTO: 0.02 10*3/MM3 (ref 0–0.2)
BASOPHILS NFR BLD AUTO: 0.3 % (ref 0–1.5)
BENZODIAZ UR QL SCN: NEGATIVE
BILIRUB UR QL STRIP: NEGATIVE
BLD GP AB SCN SERPL QL: NEGATIVE
BUPRENORPHINE SERPL-MCNC: NEGATIVE NG/ML
CANNABINOIDS SERPL QL: NEGATIVE
CLARITY UR: CLEAR
COCAINE UR QL: NEGATIVE
COLOR UR: YELLOW
DEPRECATED RDW RBC AUTO: 40.1 FL (ref 37–54)
EOSINOPHIL # BLD AUTO: 0.09 10*3/MM3 (ref 0–0.4)
EOSINOPHIL NFR BLD AUTO: 1.2 % (ref 0.3–6.2)
ERYTHROCYTE [DISTWIDTH] IN BLOOD BY AUTOMATED COUNT: 12.9 % (ref 12.3–15.4)
GLUCOSE SERPL-MCNC: 93 MG/DL (ref 65–99)
GLUCOSE UR STRIP-MCNC: NEGATIVE MG/DL
HBV SURFACE AG SERPL QL IA: NORMAL
HCT VFR BLD AUTO: 41.6 % (ref 34–46.6)
HCV AB SER DONR QL: NORMAL
HGB BLD-MCNC: 13.9 G/DL (ref 12–15.9)
HGB UR QL STRIP.AUTO: NEGATIVE
HIV1+2 AB SER QL: NORMAL
HOLD SPECIMEN: NORMAL
IMM GRANULOCYTES # BLD AUTO: 0.03 10*3/MM3 (ref 0–0.05)
IMM GRANULOCYTES NFR BLD AUTO: 0.4 % (ref 0–0.5)
KETONES UR QL STRIP: NEGATIVE
LEUKOCYTE ESTERASE UR QL STRIP.AUTO: NEGATIVE
LYMPHOCYTES # BLD AUTO: 1.84 10*3/MM3 (ref 0.7–3.1)
LYMPHOCYTES NFR BLD AUTO: 25.4 % (ref 19.6–45.3)
MCH RBC QN AUTO: 28.4 PG (ref 26.6–33)
MCHC RBC AUTO-ENTMCNC: 33.4 G/DL (ref 31.5–35.7)
MCV RBC AUTO: 85.1 FL (ref 79–97)
METHADONE UR QL SCN: NEGATIVE
MONOCYTES # BLD AUTO: 0.41 10*3/MM3 (ref 0.1–0.9)
MONOCYTES NFR BLD AUTO: 5.7 % (ref 5–12)
NEUTROPHILS NFR BLD AUTO: 4.84 10*3/MM3 (ref 1.7–7)
NEUTROPHILS NFR BLD AUTO: 67 % (ref 42.7–76)
NITRITE UR QL STRIP: NEGATIVE
NRBC BLD AUTO-RTO: 0 /100 WBC (ref 0–0.2)
OPIATES UR QL: NEGATIVE
OXYCODONE UR QL SCN: NEGATIVE
PCP UR QL SCN: NEGATIVE
PH UR STRIP.AUTO: 6.5 [PH] (ref 5–8)
PLATELET # BLD AUTO: 212 10*3/MM3 (ref 140–450)
PMV BLD AUTO: 10.1 FL (ref 6–12)
PROPOXYPH UR QL: NEGATIVE
PROT UR QL STRIP: NEGATIVE
RBC # BLD AUTO: 4.89 10*6/MM3 (ref 3.77–5.28)
RH BLD: NEGATIVE
RPR SER QL: NORMAL
RUBV IGG SERPL IA-ACNC: POSITIVE
SP GR UR STRIP: 1.01 (ref 1–1.03)
TRICYCLICS UR QL SCN: NEGATIVE
TSH SERPL DL<=0.05 MIU/L-ACNC: 0.91 UIU/ML (ref 0.27–4.2)
UROBILINOGEN UR QL STRIP: NORMAL
WBC # BLD AUTO: 7.23 10*3/MM3 (ref 3.4–10.8)

## 2020-08-20 PROCEDURE — 84443 ASSAY THYROID STIM HORMONE: CPT | Performed by: NURSE PRACTITIONER

## 2020-08-20 PROCEDURE — 86803 HEPATITIS C AB TEST: CPT | Performed by: NURSE PRACTITIONER

## 2020-08-20 PROCEDURE — 80081 OBSTETRIC PANEL INC HIV TSTG: CPT | Performed by: NURSE PRACTITIONER

## 2020-08-20 PROCEDURE — 36415 COLL VENOUS BLD VENIPUNCTURE: CPT | Performed by: NURSE PRACTITIONER

## 2020-08-20 PROCEDURE — 81003 URINALYSIS AUTO W/O SCOPE: CPT | Performed by: NURSE PRACTITIONER

## 2020-08-20 PROCEDURE — 80306 DRUG TEST PRSMV INSTRMNT: CPT | Performed by: NURSE PRACTITIONER

## 2020-08-20 PROCEDURE — 82947 ASSAY GLUCOSE BLOOD QUANT: CPT | Performed by: NURSE PRACTITIONER

## 2020-12-03 ENCOUNTER — TRANSCRIBE ORDERS (OUTPATIENT)
Dept: LAB | Facility: HOSPITAL | Age: 30
End: 2020-12-03

## 2020-12-03 ENCOUNTER — LAB (OUTPATIENT)
Dept: LAB | Facility: HOSPITAL | Age: 30
End: 2020-12-03

## 2020-12-03 DIAGNOSIS — Z34.83 PRENATAL CARE, SUBSEQUENT PREGNANCY, THIRD TRIMESTER: ICD-10-CM

## 2020-12-03 DIAGNOSIS — Z34.83 PRENATAL CARE, SUBSEQUENT PREGNANCY, THIRD TRIMESTER: Primary | ICD-10-CM

## 2020-12-03 LAB
BLD GP AB SCN SERPL QL: NEGATIVE
DEPRECATED RDW RBC AUTO: 38.8 FL (ref 37–54)
ERYTHROCYTE [DISTWIDTH] IN BLOOD BY AUTOMATED COUNT: 12.4 % (ref 12.3–15.4)
GLUCOSE 1H P 100 G GLC PO SERPL-MCNC: 169 MG/DL (ref 65–140)
HCT VFR BLD AUTO: 36.9 % (ref 34–46.6)
HGB BLD-MCNC: 12.3 G/DL (ref 12–15.9)
MCH RBC QN AUTO: 28.8 PG (ref 26.6–33)
MCHC RBC AUTO-ENTMCNC: 33.3 G/DL (ref 31.5–35.7)
MCV RBC AUTO: 86.4 FL (ref 79–97)
PLATELET # BLD AUTO: 194 10*3/MM3 (ref 140–450)
PMV BLD AUTO: 10.1 FL (ref 6–12)
RBC # BLD AUTO: 4.27 10*6/MM3 (ref 3.77–5.28)
WBC # BLD AUTO: 9.32 10*3/MM3 (ref 3.4–10.8)

## 2020-12-03 PROCEDURE — 86850 RBC ANTIBODY SCREEN: CPT

## 2020-12-03 PROCEDURE — 36415 COLL VENOUS BLD VENIPUNCTURE: CPT

## 2020-12-03 PROCEDURE — 85027 COMPLETE CBC AUTOMATED: CPT

## 2020-12-03 PROCEDURE — 82950 GLUCOSE TEST: CPT

## 2020-12-07 ENCOUNTER — TRANSCRIBE ORDERS (OUTPATIENT)
Dept: LAB | Facility: HOSPITAL | Age: 30
End: 2020-12-07

## 2020-12-07 ENCOUNTER — LAB (OUTPATIENT)
Dept: LAB | Facility: HOSPITAL | Age: 30
End: 2020-12-07

## 2020-12-07 DIAGNOSIS — Z34.83 PRENATAL CARE, SUBSEQUENT PREGNANCY, THIRD TRIMESTER: Primary | ICD-10-CM

## 2020-12-07 DIAGNOSIS — Z34.83 PRENATAL CARE, SUBSEQUENT PREGNANCY, THIRD TRIMESTER: ICD-10-CM

## 2020-12-07 LAB
GLUCOSE 1H P 100 G GLC PO SERPL-MCNC: 172 MG/DL (ref 74–180)
GLUCOSE 2H P 100 G GLC PO SERPL-MCNC: 169 MG/DL (ref 74–155)
GLUCOSE 3H P 100 G GLC PO SERPL-MCNC: 95 MG/DL (ref 74–140)
GLUCOSE P FAST SERPL-MCNC: 85 MG/DL (ref 74–106)

## 2020-12-07 PROCEDURE — 36415 COLL VENOUS BLD VENIPUNCTURE: CPT

## 2020-12-07 PROCEDURE — 82951 GLUCOSE TOLERANCE TEST (GTT): CPT | Performed by: NURSE PRACTITIONER

## 2020-12-07 PROCEDURE — 82952 GTT-ADDED SAMPLES: CPT

## 2021-02-18 ENCOUNTER — HOSPITAL ENCOUNTER (INPATIENT)
Facility: HOSPITAL | Age: 31
LOS: 2 days | Discharge: HOME | End: 2021-02-20
Attending: NURSE PRACTITIONER | Admitting: ADVANCED PRACTICE MIDWIFE

## 2021-02-18 PROBLEM — Z3A.39 39 WEEKS GESTATION OF PREGNANCY: Status: RESOLVED | Noted: 2021-02-18 | Resolved: 2021-02-18

## 2021-02-18 PROBLEM — B95.1 GROUP B STREPTOCOCCAL INFECTION DURING PREGNANCY: Status: ACTIVE | Noted: 2021-02-18

## 2021-02-18 PROBLEM — Z3A.39 39 WEEKS GESTATION OF PREGNANCY: Status: ACTIVE | Noted: 2021-02-18

## 2021-02-18 PROBLEM — O98.819 GROUP B STREPTOCOCCAL INFECTION DURING PREGNANCY: Status: ACTIVE | Noted: 2021-02-18

## 2021-02-18 LAB
ABO GROUP BLD: NORMAL
ABO GROUP BLD: NORMAL
BLD GP AB SCN SERPL QL: NEGATIVE
DEPRECATED RDW RBC AUTO: 41.5 FL (ref 37–54)
ERYTHROCYTE [DISTWIDTH] IN BLOOD BY AUTOMATED COUNT: 13.2 % (ref 12.3–15.4)
FETAL BLEED: NEGATIVE
HCT VFR BLD AUTO: 42.1 % (ref 34–46.6)
HGB BLD-MCNC: 14.1 G/DL (ref 12–15.9)
MCH RBC QN AUTO: 29.4 PG (ref 26.6–33)
MCHC RBC AUTO-ENTMCNC: 33.5 G/DL (ref 31.5–35.7)
MCV RBC AUTO: 87.7 FL (ref 79–97)
NUMBER OF DOSES: NORMAL
PLATELET # BLD AUTO: 218 10*3/MM3 (ref 140–450)
PMV BLD AUTO: 10.2 FL (ref 6–12)
RBC # BLD AUTO: 4.8 10*6/MM3 (ref 3.77–5.28)
RH BLD: NEGATIVE
RH BLD: NEGATIVE
SARS-COV-2 RDRP RESP QL NAA+PROBE: NORMAL
T&S EXPIRATION DATE: NORMAL
WBC # BLD AUTO: 9.41 10*3/MM3 (ref 3.4–10.8)

## 2021-02-18 PROCEDURE — 86901 BLOOD TYPING SEROLOGIC RH(D): CPT | Performed by: OBSTETRICS & GYNECOLOGY

## 2021-02-18 PROCEDURE — 86850 RBC ANTIBODY SCREEN: CPT | Performed by: OBSTETRICS & GYNECOLOGY

## 2021-02-18 PROCEDURE — 86900 BLOOD TYPING SEROLOGIC ABO: CPT | Performed by: OBSTETRICS & GYNECOLOGY

## 2021-02-18 PROCEDURE — 25010000002 PENICILLIN G POTASSIUM PER 600000 UNITS: Performed by: OBSTETRICS & GYNECOLOGY

## 2021-02-18 PROCEDURE — 87635 SARS-COV-2 COVID-19 AMP PRB: CPT | Performed by: OBSTETRICS & GYNECOLOGY

## 2021-02-18 PROCEDURE — 59025 FETAL NON-STRESS TEST: CPT

## 2021-02-18 PROCEDURE — 85461 HEMOGLOBIN FETAL: CPT | Performed by: OBSTETRICS & GYNECOLOGY

## 2021-02-18 PROCEDURE — 85027 COMPLETE CBC AUTOMATED: CPT | Performed by: OBSTETRICS & GYNECOLOGY

## 2021-02-18 PROCEDURE — 6A550ZT PHERESIS OF CORD BLOOD STEM CELLS, SINGLE: ICD-10-PCS | Performed by: ADVANCED PRACTICE MIDWIFE

## 2021-02-18 RX ORDER — HYDROCORTISONE 25 MG/G
1 CREAM TOPICAL AS NEEDED
Status: DISCONTINUED | OUTPATIENT
Start: 2021-02-18 | End: 2021-02-20 | Stop reason: HOSPADM

## 2021-02-18 RX ORDER — ONDANSETRON 2 MG/ML
4 INJECTION INTRAMUSCULAR; INTRAVENOUS EVERY 6 HOURS PRN
Status: DISCONTINUED | OUTPATIENT
Start: 2021-02-18 | End: 2021-02-20 | Stop reason: HOSPADM

## 2021-02-18 RX ORDER — TERBUTALINE SULFATE 1 MG/ML
0.25 INJECTION, SOLUTION SUBCUTANEOUS AS NEEDED
Status: DISCONTINUED | OUTPATIENT
Start: 2021-02-18 | End: 2021-02-18 | Stop reason: HOSPADM

## 2021-02-18 RX ORDER — PROMETHAZINE HYDROCHLORIDE 12.5 MG/1
12.5 SUPPOSITORY RECTAL EVERY 6 HOURS PRN
Status: DISCONTINUED | OUTPATIENT
Start: 2021-02-18 | End: 2021-02-18 | Stop reason: HOSPADM

## 2021-02-18 RX ORDER — IBUPROFEN 600 MG/1
600 TABLET ORAL EVERY 6 HOURS PRN
Status: DISCONTINUED | OUTPATIENT
Start: 2021-02-18 | End: 2021-02-20 | Stop reason: HOSPADM

## 2021-02-18 RX ORDER — MISOPROSTOL 200 UG/1
800 TABLET ORAL AS NEEDED
Status: DISCONTINUED | OUTPATIENT
Start: 2021-02-18 | End: 2021-02-18 | Stop reason: HOSPADM

## 2021-02-18 RX ORDER — SODIUM CHLORIDE 0.9 % (FLUSH) 0.9 %
3 SYRINGE (ML) INJECTION EVERY 12 HOURS SCHEDULED
Status: DISCONTINUED | OUTPATIENT
Start: 2021-02-18 | End: 2021-02-18 | Stop reason: HOSPADM

## 2021-02-18 RX ORDER — MAGNESIUM CARB/ALUMINUM HYDROX 105-160MG
30 TABLET,CHEWABLE ORAL ONCE
Status: DISCONTINUED | OUTPATIENT
Start: 2021-02-18 | End: 2021-02-18 | Stop reason: HOSPADM

## 2021-02-18 RX ORDER — PROMETHAZINE HYDROCHLORIDE 12.5 MG/1
12.5 TABLET ORAL EVERY 6 HOURS PRN
Status: DISCONTINUED | OUTPATIENT
Start: 2021-02-18 | End: 2021-02-18 | Stop reason: HOSPADM

## 2021-02-18 RX ORDER — OXYTOCIN-SODIUM CHLORIDE 0.9% IV SOLN 30 UNIT/500ML 30-0.9/5 UT/ML-%
85 SOLUTION INTRAVENOUS ONCE
Status: COMPLETED | OUTPATIENT
Start: 2021-02-18 | End: 2021-02-18

## 2021-02-18 RX ORDER — PRENATAL VIT/IRON FUM/FOLIC AC 27MG-0.8MG
1 TABLET ORAL DAILY
Status: DISCONTINUED | OUTPATIENT
Start: 2021-02-18 | End: 2021-02-20 | Stop reason: HOSPADM

## 2021-02-18 RX ORDER — SODIUM CHLORIDE 0.9 % (FLUSH) 0.9 %
1-10 SYRINGE (ML) INJECTION AS NEEDED
Status: DISCONTINUED | OUTPATIENT
Start: 2021-02-18 | End: 2021-02-20 | Stop reason: HOSPADM

## 2021-02-18 RX ORDER — BISACODYL 10 MG
10 SUPPOSITORY, RECTAL RECTAL DAILY PRN
Status: DISCONTINUED | OUTPATIENT
Start: 2021-02-19 | End: 2021-02-20 | Stop reason: HOSPADM

## 2021-02-18 RX ORDER — ACETAMINOPHEN 325 MG/1
650 TABLET ORAL EVERY 4 HOURS PRN
Status: DISCONTINUED | OUTPATIENT
Start: 2021-02-18 | End: 2021-02-18 | Stop reason: HOSPADM

## 2021-02-18 RX ORDER — OXYTOCIN-SODIUM CHLORIDE 0.9% IV SOLN 30 UNIT/500ML 30-0.9/5 UT/ML-%
650 SOLUTION INTRAVENOUS ONCE
Status: COMPLETED | OUTPATIENT
Start: 2021-02-18 | End: 2021-02-18

## 2021-02-18 RX ORDER — LANOLIN
CREAM (ML) TOPICAL
Status: DISCONTINUED | OUTPATIENT
Start: 2021-02-18 | End: 2021-02-20 | Stop reason: HOSPADM

## 2021-02-18 RX ORDER — LIDOCAINE HYDROCHLORIDE 10 MG/ML
5 INJECTION, SOLUTION EPIDURAL; INFILTRATION; INTRACAUDAL; PERINEURAL AS NEEDED
Status: DISCONTINUED | OUTPATIENT
Start: 2021-02-18 | End: 2021-02-18 | Stop reason: HOSPADM

## 2021-02-18 RX ORDER — SODIUM CHLORIDE, SODIUM LACTATE, POTASSIUM CHLORIDE, CALCIUM CHLORIDE 600; 310; 30; 20 MG/100ML; MG/100ML; MG/100ML; MG/100ML
125 INJECTION, SOLUTION INTRAVENOUS CONTINUOUS
Status: DISCONTINUED | OUTPATIENT
Start: 2021-02-18 | End: 2021-02-19

## 2021-02-18 RX ORDER — MORPHINE SULFATE 2 MG/ML
2 INJECTION, SOLUTION INTRAMUSCULAR; INTRAVENOUS
Status: DISCONTINUED | OUTPATIENT
Start: 2021-02-18 | End: 2021-02-18 | Stop reason: HOSPADM

## 2021-02-18 RX ORDER — DOCUSATE SODIUM 100 MG/1
100 CAPSULE, LIQUID FILLED ORAL 2 TIMES DAILY
Status: DISCONTINUED | OUTPATIENT
Start: 2021-02-18 | End: 2021-02-20 | Stop reason: HOSPADM

## 2021-02-18 RX ORDER — SODIUM CHLORIDE 0.9 % (FLUSH) 0.9 %
10 SYRINGE (ML) INJECTION AS NEEDED
Status: DISCONTINUED | OUTPATIENT
Start: 2021-02-18 | End: 2021-02-18 | Stop reason: HOSPADM

## 2021-02-18 RX ORDER — PENICILLIN G 3000000 [IU]/50ML
3 INJECTION, SOLUTION INTRAVENOUS EVERY 4 HOURS
Status: DISCONTINUED | OUTPATIENT
Start: 2021-02-18 | End: 2021-02-18 | Stop reason: HOSPADM

## 2021-02-18 RX ORDER — IBUPROFEN 600 MG/1
600 TABLET ORAL EVERY 6 HOURS PRN
Status: DISCONTINUED | OUTPATIENT
Start: 2021-02-18 | End: 2021-02-18

## 2021-02-18 RX ORDER — ACETAMINOPHEN 500 MG
1000 TABLET ORAL EVERY 6 HOURS PRN
Status: DISCONTINUED | OUTPATIENT
Start: 2021-02-18 | End: 2021-02-20 | Stop reason: HOSPADM

## 2021-02-18 RX ORDER — CARBOPROST TROMETHAMINE 250 UG/ML
250 INJECTION, SOLUTION INTRAMUSCULAR AS NEEDED
Status: DISCONTINUED | OUTPATIENT
Start: 2021-02-18 | End: 2021-02-18 | Stop reason: HOSPADM

## 2021-02-18 RX ORDER — BUTALBITAL, ACETAMINOPHEN AND CAFFEINE 50; 325; 40 MG/1; MG/1; MG/1
1 TABLET ORAL EVERY 4 HOURS PRN
COMMUNITY

## 2021-02-18 RX ORDER — OXYCODONE HYDROCHLORIDE AND ACETAMINOPHEN 5; 325 MG/1; MG/1
2 TABLET ORAL EVERY 4 HOURS PRN
Status: DISCONTINUED | OUTPATIENT
Start: 2021-02-18 | End: 2021-02-18 | Stop reason: HOSPADM

## 2021-02-18 RX ORDER — METHYLERGONOVINE MALEATE 0.2 MG/ML
200 INJECTION INTRAVENOUS ONCE AS NEEDED
Status: DISCONTINUED | OUTPATIENT
Start: 2021-02-18 | End: 2021-02-18 | Stop reason: HOSPADM

## 2021-02-18 RX ADMIN — DOCUSATE SODIUM 100 MG: 100 CAPSULE, LIQUID FILLED ORAL at 14:38

## 2021-02-18 RX ADMIN — OXYTOCIN 85 ML/HR: 10 INJECTION INTRAVENOUS at 08:54

## 2021-02-18 RX ADMIN — PRENATAL VITAMINS-IRON FUMARATE 27 MG IRON-FOLIC ACID 0.8 MG TABLET 1 TABLET: at 14:38

## 2021-02-18 RX ADMIN — SODIUM CHLORIDE 5 MILLION UNITS: 900 INJECTION INTRAVENOUS at 05:19

## 2021-02-18 RX ADMIN — DOCUSATE SODIUM 100 MG: 100 CAPSULE, LIQUID FILLED ORAL at 20:21

## 2021-02-18 RX ADMIN — IBUPROFEN 600 MG: 600 TABLET ORAL at 09:30

## 2021-02-18 RX ADMIN — SODIUM CHLORIDE, POTASSIUM CHLORIDE, SODIUM LACTATE AND CALCIUM CHLORIDE 125 ML/HR: 600; 310; 30; 20 INJECTION, SOLUTION INTRAVENOUS at 05:18

## 2021-02-18 RX ADMIN — ACETAMINOPHEN 1000 MG: 500 TABLET ORAL at 11:01

## 2021-02-18 RX ADMIN — OXYTOCIN 650 ML/HR: 10 INJECTION INTRAVENOUS at 08:21

## 2021-02-18 RX ADMIN — IBUPROFEN 600 MG: 600 TABLET ORAL at 20:20

## 2021-02-18 NOTE — PLAN OF CARE
Problem: Breastfeeding  Goal: Effective Breastfeeding  Outcome: Ongoing, Progressing  Intervention: Promote Effective Breastfeeding  Flowsheets (Taken 2/18/2021 1410)  Breastfeeding Assistance:   support offered   feeding cue recognition promoted   feeding on demand promoted  Parent/Child Attachment Promotion:   caring behavior modeled   cue recognition promoted   participation in care promoted   skin-to-skin contact encouraged   positive reinforcement provided   strengths emphasized  Intervention: Support Exclusive Breastfeeding Success  Flowsheets (Taken 2/18/2021 1410)  Supportive Measures: active listening utilized  Breastfeeding Support:   encouragement provided   diary/feeding log utilized   lactation counseling provided   Goal Outcome Evaluation:

## 2021-02-18 NOTE — LACTATION NOTE
02/18/21 1410   Maternal Information   Person Making Referral   (courtesy consult)   Maternal Reason for Referral   (did not breastfeed 1st;  2nd for ~17 months)   Infant Reason for Referral   (reports this baby is breastfeeding well)   Milk Expression/Equipment   Breast Pump Type double electric, personal  (has pump at home)   Teaching done as documented under Education. To call lactation services, if there are questions or concerns or if mom wants help with a breastfeeding.

## 2021-02-18 NOTE — PAYOR COMM NOTE
"Louisa Pike (30 y.o. Female)     [place] Medicaid ID#[id number]    Delivery information.    From: Nathalie Spears  #[phone]  Fax#[phone]      Date of Birth Social Security Number Address Home Phone MRN    1990  [address] [place] [address] [phone] [id number]    Zoroastrianism Marital Status          None Single       Admission Date Admission Type Admitting Provider Attending Provider Department, Room/Bed    21 Elective Samaria Estevez MD Morgan, Alisha Coffey, CNM [facility] LABOR DELIVERY, N305/    Discharge Date Discharge Disposition Discharge Destination                       Attending Provider: Trang Richards CNM    Allergies: Coconut, Sulfa Antibiotics    Isolation: None   Infection: None   Code Status: CPR    Ht: 165.1 cm (65\")   Wt: 92.1 kg (203 lb)    Admission Cmt: None   Principal Problem: None                Active Insurance as of 2021     Primary Coverage     Payor Plan Insurance Group Employer/Plan Group    [employer] MED[employer]ID [employer] [id number]     Payor Plan Address Payor Plan Phone Number Payor Plan Fax Number Effective Dates    [address] [address] [phone]  2020 - None Entered    [facility] [id number]       Subscriber Name Subscriber Birth Date Member ID       LOUISA PIKE 1990 [id number]                 Emergency Contacts      (Rel.) Home Phone Work Phone Mobile Phone    Efrain Shelton (Significant Other) -- -- [phone]            Insurance Information                [employer] MEDICAID/[employer] MEDICAID Phone: [phone]    Subscriber: Louisa Pike Subscriber#: [id number]    Group#: [id number] Precert#:           Problem List           Codes Noted - Resolved       Hospital    Group B streptococcal infection during pregnancy ICD-10-CM: O98.819, B95.1  ICD-9-CM: 647.80, 041.02 2021 - Present     (normal spontaneous vaginal delivery) ICD-10-CM: O80  ICD-9-CM: 650 " 2021 - Present       Non-Hospital    Anxiety ICD-10-CM: F41.9  ICD-9-CM: 300.00 2016 - Present             History & Physical      Trang Richards CNM at 21 0742          [facility]  Obstetric History and Physical    Chief Complaint   Patient presents with   • Laboring       Subjective     Patient is a 30 y.o. female  currently at 39w5d, who presents with labor and SROM.    Her prenatal care is benign.  Her one hour GCT was elevated however 3 hour GTT was WNL.  Her previous obstetric/gynecological history is noted for is non-contributory.    The following portions of the patients history were reviewed and updated as appropriate: current medications, allergies, past medical history, past surgical history, past family history, past social history and problem list .       Prenatal Information:  Prenatal Results     POC Urine Glucose/Protein     Test Value Reference Range Date Time    Urine Glucose        Urine Protein              Initial Prenatal Labs     Test Value Reference Range Date Time    Hemoglobin 13.9 g/dL 12.0 - 15.9 20 1117    Hematocrit 41.6 % 34.0 - 46.6 20 1117    Platelets 218 10*3/mm3 140 - 450 21 0522      194 10*3/mm3 140 - 450 20 1007      212 10*3/mm3 140 - 450 20 1117    Rubella IgG Positive   20 1117    Hepatitis B SAg Non-Reactive  Non-Reactive 20 1117    Hepatitis C Ab Non-Reactive  Non-Reactive 20 1117    RPR Non-Reactive  Non-Reactive 20 1117    ABO A   21 0544    Rh Negative   21 0544    Antibody Screen Negative   20 1117    HIV Non-Reactive  Non-Reactive 20 1117    Urine Culture        Gonorrhea        Chlamydia        TSH 0.914 uIU/mL 0.270 - 4.200 20 1117          2nd and 3rd Trimester     Test Value Reference Range Date Time    Hemoglobin (repeated) 14.1 g/dL 12.0 - 15.9 21 0522      12.3 g/dL 12.0 - 15.9 20 1007    Hematocrit (repeated) 42.1 % 34.0 - 46.6  02/18/21 0522      36.9 % 34.0 - 46.6 12/03/20 1007     mg/dL 74 - 180 12/07/20 1049      169 mg/dL 65 - 140 12/03/20 1007    Antibody Screen (repeated) Negative   02/18/21 0544      Negative   12/03/20 1007    GTT Fasting 96 mg/dL  02/01/19     GTT 1 Hr        GTT 2 Hr        GTT 3 Hr        Group B Strep              Drug Screening     Test Value Reference Range Date Time    Amphetamine Screen Negative  Negative 08/20/20 1117    Barbiturate Screen Negative  Negative 08/20/20 1117    Benzodiazepine Screen Negative  Negative 08/20/20 1117    Methadone Screen Negative  Negative 08/20/20 1117    Phencyclidine Screen Negative  Negative 08/20/20 1117    Opiates Screen Negative  Negative 08/20/20 1117    THC Screen Negative  Negative 08/20/20 1117    Cocaine Screen Negative  Negative 08/20/20 1117    Propoxyphene Screen Negative  Negative 08/20/20 1117    Buprenorphine Screen Negative  Negative 08/20/20 1117    Methamphetamine Screen Negative  Negative 08/20/20 1117    Oxycodone Screen Negative  Negative 08/20/20 1117    Tricyclic Antidepressants Screen Negative  Negative 08/20/20 1117          Other (Risk screening)     Test Value Reference Range Date Time    Varicella IgG        Parvovirus IgG        CMV IgG        Cystic Fibrosis        Hemoglobin electrophoresis        NIPT        MSAFP-4        AFP (for NTD only)                  External Prenatal Results     Pregnancy Outside Results - Transcribed From Office Records - See Scanned Records For Details     Test Value Date Time    Hgb 14.1 g/dL 02/18/21 0522      12.3 g/dL 12/03/20 1007      13.9 g/dL 08/20/20 1117    Hct 42.1 % 02/18/21 0522      36.9 % 12/03/20 1007      41.6 % 08/20/20 1117    ABO A  02/18/21 0544    Rh Negative  02/18/21 0544    Antibody Screen Negative  02/18/21 0544      Negative  12/03/20 1007      Negative  08/20/20 1117    Glucose Fasting GTT 96 mg/dL 02/01/19     Glucose Tolerance Test 1 hour       Glucose Tolerance Test 3 hour        Gonorrhea (discrete)       Chlamydia (discrete)       RPR Non-Reactive  20 111    VDRL       Syphilis Antibody       Rubella Positive  20    HBsAg Non-Reactive  20 111    Herpes Simplex Virus PCR       Herpes Simplex VIrus Culture       HIV Non-Reactive  20 111    Hep C RNA Quant PCR       Hep C Antibody Non-Reactive  20 111    AFP       Group B Strep       GBS Susceptibility to Clindamycin       GBS Susceptibility to Erythromycin       Fetal Fibronectin       Genetic Testing, Maternal Blood             Drug Screening     Test Value Date Time    Urine Drug Screen       Amphetamine Screen Negative  20 1117    Barbiturate Screen Negative  20 111    Benzodiazepine Screen Negative  20 111    Methadone Screen Negative  20 111    Phencyclidine Screen Negative  20 111    Opiates Screen Negative  20 111    THC Screen Negative  20 111    Cocaine Screen       Propoxyphene Screen Negative  20 111    Buprenorphine Screen Negative  20 1117    Methamphetamine Screen       Oxycodone Screen Negative  20 1117    Tricyclic Antidepressants Screen Negative  20 1117                 Past OB History:     OB History    Para Term  AB Living   3 2 2 0 0 2   SAB TAB Ectopic Molar Multiple Live Births   0 0 0 0 0 2      # Outcome Date GA Lbr Rikki/2nd Weight Sex Delivery Anes PTL Lv   3 Current            2 Term 19 39w5d / 00:20 3215 g (7 lb 1.4 oz) F Vag-Spont None N RENEA      Name: JUSTIN PIKE      Apgar1: 9  Apgar5: 9   1 Term 13 40w0d  3232 g (7 lb 2 oz) M Vag-Spont None  RENEA       Past Medical History: Past Medical History:   Diagnosis Date   • Anxiety    • Hypoglycemia       Past Surgical History Past Surgical History:   Procedure Laterality Date   • CYST REMOVAL     • WISDOM TOOTH EXTRACTION        Family History: No family history on file.   Social History:  reports that she has never smoked.  She has never used smokeless tobacco.   reports no history of alcohol use.   reports no history of drug use.        Review of Systems   Constitutional: Negative.    HENT: Negative.    Eyes: Negative.    Respiratory: Negative.    Cardiovascular: Negative.    Gastrointestinal: Negative.    Endocrine: Negative.    Genitourinary: Negative.    Musculoskeletal: Negative.    Skin: Negative.    Allergic/Immunologic: Negative.    Neurological: Negative.    Hematological: Negative.    Psychiatric/Behavioral: Negative.          Objective     Vital Signs Range for the last 24 hours  Temperature: Temp:  [97.7 °F (36.5 °C)-97.8 °F (36.6 °C)] 97.7 °F (36.5 °C)   Temp Source: Temp src: Oral   BP: BP: (116-118)/(74-80) 116/80   Pulse: Heart Rate:  [80-96] 96   Respirations: Resp:  [20] 20   SPO2:     O2 Amount (l/min):     O2 Devices     Weight: Weight:  [92.1 kg (203 lb)] 92.1 kg (203 lb)     Physical Examination: General appearance - alert, well appearing, and in no distress  Neck - supple, no significant adenopathy  Chest - clear to auscultation, no wheezes, rales or rhonchi, symmetric air entry  Heart - normal rate, regular rhythm, normal S1, S2, no murmurs, rubs, clicks or gallops  Abdomen - soft, nontender, nondistended, no masses or organomegaly  Pelvic - normal external genitalia, vulva, vagina, cervix, uterus and adnexa  Extremities - peripheral pulses normal, no pedal edema, no clubbing or cyanosis    Presentation: vertex   Cervix: Exam by:   CNCARITO   Dilation: Cervical Dilation (cm): 8   Effacement: Cervical Effacement: 70%   Station: Fetal Station: 1-->-2     Fetal Heart Rate Assessment   Method: Fetal HR Assessment Method: external   Beats/min: Fetal HR (beats/min): 135   Baseline: Fetal Heart Baseline Rate: normal range   Variability: Fetal HR Variability: moderate (amplitude range 6 to 25 bpm)   Accels: Fetal HR Accelerations: greater than/equal to 15 bpm, lasting at least 15 seconds   Decels: Fetal HR Decelerations:  variable   Tracing Category:       Uterine Assessment   Method: Method: external tocotransducer   Frequency (min): Contraction Frequency (Minutes): 2-3   Ctx Count in 10 min:     Duration:     Intensity: Contraction Intensity: strong by palpation   Intensity by IUPC:     Resting Tone: Uterine Resting Tone: soft by palpation   Resting Tone by IUPC:     Luis Felipe Units:       Assessment/Plan       39 weeks gestation of pregnancy    Group B streptococcal infection during pregnancy      Assessment & Plan    Assessment:  1.  Intrauterine pregnancy at 39w5d gestation with reactive fetal status.    2.  labor  with ROM  3.  Obstetrical history significant for is non-contributory.  4.  GBS status: Positive    Plan:  1. Admit for labor management, delivery and post partum care.  Patient desires unmedicated labor and birth.  Continue IV penicillin for maternal GBS infection.  Plan AROM of amniotic forebag after second dose of PCN administered.  Will otherwise proceed with expectant management  2. Plan of care has been reviewed with patient and she verbalizes understanding  3.  Risks, benefits of treatment plan have been discussed.  4.  All questions have been answered.  5.  Patient aware that pain management options are available to her if she desires.        Trang Richards CNM  2/18/2021  07:53 EST    Electronically signed by Trang Richards CNM at 02/18/21 0756     H&P signed by New Onbase, [place] at 02/18/21 0534      Scan on 2/18/2021 by New Onbase, [place]: H&amp;P PRENATAL RECORD, [facility] , 01/20/2021          Electronically signed by New Onbase, [place] at 02/18/21 0534         Facility-Administered Medications as of 2/18/2021   Medication Dose Route Frequency Provider Last Rate Last Admin   • acetaminophen (TYLENOL) tablet 650 mg  650 mg Oral Q4H PRN Samaria Estevez MD       • carboprost (HEMABATE) injection 250 mcg  250 mcg Intramuscular PRN Samaria Estevez MD       • ibuprofen  (ADVIL,MOTRIN) tablet 600 mg  600 mg Oral Q6H PRN DanielLuann wahl, CNM   600 mg at 02/18/21 0930   • lactated ringers bolus 1,000 mL  1,000 mL Intravenous Once Samaria Estevez MD       • lactated ringers infusion  125 mL/hr Intravenous Continuous Samaria Estevez MD   Stopped at 02/18/21 0821   • lidocaine PF 1% (XYLOCAINE) injection 5 mL  5 mL Intradermal PRN Samaria Estevez MD       • methylergonovine (METHERGINE) injection 200 mcg  200 mcg Intramuscular Once PRN Samaria Estevez MD       • mineral oil liquid 30 mL  30 mL Topical Once Samaria Estevez MD       • miSOPROStol (CYTOTEC) tablet 800 mcg  800 mcg Rectal PRN Samaria Estevez MD       • morphine injection 2 mg  2 mg Intravenous Q2H PRN Samaria Estevez MD       • oxyCODONE-acetaminophen (PERCOCET) 5-325 MG per tablet 2 tablet  2 tablet Oral Q4H PRN Samaria Estevez MD       • [COMPLETED] oxytocin in sodium chloride (PITOCIN) 30 UNIT/500ML infusion solution  650 mL/hr Intravenous Once Samaria Estevez  mL/hr at 02/18/21 0821 650 mL/hr at 02/18/21 0821    Followed by   • [COMPLETED] oxytocin in sodium chloride (PITOCIN) 30 UNIT/500ML infusion solution  85 mL/hr Intravenous Once Samaria Estevez MD 85 mL/hr at 02/18/21 0854 85 mL/hr at 02/18/21 0854   • [COMPLETED] penicillin g 5 mu/100 mL 0.9% NS IVPB (mbp)  5 Million Units Intravenous Once Samaria Estevez MD   5 Million Units at 02/18/21 0519    Followed by   • penicillin G in iso-osmotic dextrose IVPB 3 million units (premix)  3 Million Units Intravenous Q4H Samaria Estevez MD       • promethazine (PHENERGAN) suppository 12.5 mg  12.5 mg Rectal Q6H PRN Samaria Estevez MD        Or   • promethazine (PHENERGAN) tablet 12.5 mg  12.5 mg Oral Q6H PRN Samaria Estevez MD       • sodium chloride 0.9 % flush 10 mL  10 mL Intravenous PRN Samaria Estevez MD       • sodium chloride 0.9 % flush 3 mL  3 mL Intravenous Q12H Samaria Estevez MD       • terbutaline (BRETHINE) injection 0.25 mg  0.25 mg  Subcutaneous Samaria Templeton MD           Lab Results (last 24 hours)     Procedure Component Value Units Date/Time    CBC (No Diff) [[id number]]  (Normal) Collected: 02/18/21 0522    Specimen: Blood Updated: 02/18/21 0601     WBC 9.41 10*3/mm3      RBC 4.80 10*6/mm3      Hemoglobin 14.1 g/dL      Hematocrit 42.1 %      MCV 87.7 fL      MCH 29.4 pg      MCHC 33.5 g/dL      RDW 13.2 %      RDW-SD 41.5 fl      MPV 10.2 fL      Platelets 218 10*3/mm3     COVID PRE-OP / PRE-PROCEDURE SCREENING ORDER (NO ISOLATION) - Swab, Nasopharynx [[id number]]  (Normal) Collected: 02/18/21 0523    Specimen: Swab from Nasopharynx Updated: 02/18/21 0601    Narrative:      The following orders were created for panel order COVID PRE-OP / PRE-PROCEDURE SCREENING ORDER (NO ISOLATION) - Swab, Nasopharynx.  Procedure                               Abnormality         Status                     ---------                               -----------         ------                     COVID-19, ABBOTT IN-HOUS...[[id number]]  Normal              Final result                 Please view results for these tests on the individual orders.    COVID-19, ABBOTT IN-HOUSE,NASAL Swab (NO TRANSPORT MEDIA) 2 HR TAT - Swab, Nasopharynx [[id number]]  (Normal) Collected: 02/18/21 0523    Specimen: Swab from Nasopharynx Updated: 02/18/21 0601     COVID19 Presumptive Negative    Narrative:      Fact sheet for providers: https://www.fda.gov/media/267881/download     Fact sheet for patients: https://www.fda.gov/media/000551/download    Test performed by PCR.  If inconsistent with clinical signs and symptoms patient should be tested with different authorized molecular test.        Orders (last 24 hrs)      Start     Ordered    02/18/21 0958  penicillin G in iso-osmotic dextrose IVPB 3 million units (premix)  Every 4 Hours      02/18/21 0514 02/18/21 0915  oxytocin in sodium chloride (PITOCIN) 30 UNIT/500ML infusion solution  Once      02/18/21 0817 02/18/21 0900   sodium chloride 0.9 % flush 3 mL  Every 12 Hours Scheduled      02/18/21 0514    02/18/21 0852  ibuprofen (ADVIL,MOTRIN) tablet 600 mg  Every 6 Hours PRN      02/18/21 0852    02/18/21 0832  VTE Prophylaxis Not Indicated: No Risk Factors (0); </= 3 (Low Risk)  Once      02/18/21 0832    02/18/21 0818  Notify Physician (specified)  Until Discontinued      02/18/21 0817    02/18/21 0818  Vital Signs Per hospital policy  Per Hospital Policy      02/18/21 0817    02/18/21 0818  Up as Tolerated  Until Discontinued      02/18/21 0817    02/18/21 0818  Fundal & Lochia Check  Per Order Details     Comments: Every 15 Minutes x4, Then Every 30 Minutes x2, Then Every Shift    02/18/21 0817    02/18/21 0818  Fundal & Lochia Check  Every Shift      02/18/21 0817    02/18/21 0818  Diet Regular  Diet Effective Now      02/18/21 0817    02/18/21 0818  Fetal Bleed Screen  Once      02/18/21 0817    02/18/21 0817  oxytocin in sodium chloride (PITOCIN) 30 UNIT/500ML infusion solution  Once      02/18/21 0817    02/18/21 0817  acetaminophen (TYLENOL) tablet 650 mg  Every 4 Hours PRN      02/18/21 0817    02/18/21 0817  oxyCODONE-acetaminophen (PERCOCET) 5-325 MG per tablet 2 tablet  Every 4 Hours PRN      02/18/21 0817    02/18/21 0817  morphine injection 2 mg  Every 2 Hours PRN      02/18/21 0817    02/18/21 0817  methylergonovine (METHERGINE) injection 200 mcg  Once As Needed      02/18/21 0817    02/18/21 0817  carboprost (HEMABATE) injection 250 mcg  As Needed      02/18/21 0817    02/18/21 0817  miSOPROStol (CYTOTEC) tablet 800 mcg  As Needed      02/18/21 0817    02/18/21 0600  lactated ringers bolus 1,000 mL  Once      02/18/21 0514    02/18/21 0600  lactated ringers infusion  Continuous      02/18/21 0514    02/18/21 0600  mineral oil liquid 30 mL  Once      02/18/21 0514    02/18/21 0600  penicillin g 5 mu/100 mL 0.9% NS IVPB (mbp)  Once      02/18/21 0514    02/18/21 0538  Type & Screen  Once      02/18/21 0514    02/18/21  0522  COVID PRE-OP / PRE-PROCEDURE SCREENING ORDER (NO ISOLATION) - Swab, Nasopharynx  Once      21 0521    21 0522  COVID-19 ABBOTT IN-HOUSE,NASAL Swab (NO TRANSPORT MEDIA) 2 HR TAT - Swab, Nasopharynx  PROCEDURE ONCE      21 0522    21 0513  Place Sequential Compression Device  Once      21 0514    21 0513  Maintain Sequential Compression Device  Continuous      21 0514    21 0513  Advance Diet as Tolerated  Until Discontinued      21 0514    21 05  Admit To Obstetrics Inpatient  Once      21 0514    21 05  Code Status and Medical Interventions:  Continuous      21 0514    21  Obtain Informed Consent  Once      21 0514    21  Vital Signs Per hospital policy  Per Hospital Policy      21 0514    21 05  Mini- prep prior to delivery  Once      21 0514    21  Continuous Fetal Monitoring With NST on Admission and Prior to Initiation of Oxytocin.  Per Order Details     Comments: Continuous Fetal Monitoring With NST on Admission & Prior to Initiation of Oxytocin.    21 0514    21 05  External Uterine Contraction Monitoring  Per Hospital Policy      21 0514    21 0512  Notify Physician (specified)  Until Discontinued      21 0514    21 05  Notify physician for hyperstimulus (per hospital algorithm)  Until Discontinued      21 0514    21 05  Notify physician if membranes ruptured, bleeding greater than 1 pad an hour, fetal heart tone abnormality, and severe pain  Until Discontinued      21 0514    21 0512  Initiate Group Beta Strep (GBS) Prophylaxis Protocol, If Criteria Met  Continuous     Comments: NO TREATMENT RECOMMENDED IF: 1)  Maternal GBS status known negative 2)  Scheduled  birth with intact membranes, not in labor.  3 ) Maternal GBS unknown, no risk factors.   TREAT WITH ANTIBIOTICS IF:  1)  Maternal  GBS status is known postive.  2)  Maternal GBS status unknown with these risk factors:  a)  Previous infant affected by GBS infection.  b)  GBS urinary tract infection (UTI) or bacteruria during pregnancy  c)  Unexplained maternal fever in labor (greater than or equal to 100.4F or 38.0C)  d)  Prolonged rupture of the membranes greater than or equal to 18 hours.  e)  Gestational age less than 37 weeks.    02/18/21 0514 02/18/21 0512  NPO Diet NPO Except: Ice chips  Diet Effective Now,   Status:  Canceled      02/18/21 0514    02/18/21 0512  CBC (No Diff)  Once      02/18/21 0514    02/18/21 0512  Insert Peripheral IV  Once      02/18/21 0514    02/18/21 0512  Saline Lock & Maintain IV Access  Continuous      02/18/21 0514 02/18/21 0511  sodium chloride 0.9 % flush 10 mL  As Needed      02/18/21 0514    02/18/21 0511  promethazine (PHENERGAN) suppository 12.5 mg  Every 6 Hours PRN      02/18/21 0514    02/18/21 0511  promethazine (PHENERGAN) tablet 12.5 mg  Every 6 Hours PRN      02/18/21 0514    02/18/21 0511  terbutaline (BRETHINE) injection 0.25 mg  As Needed      02/18/21 0514    02/18/21 0511  lidocaine PF 1% (XYLOCAINE) injection 5 mL  As Needed      02/18/21 0514    Unscheduled  Position change  As Needed     Comments: For intra-uterine resuscitation for hypertonus, hypertstimulation, or non-reassuring fetal status    02/18/21 0514    Unscheduled  Apply Ice to Perineum  As Needed      02/18/21 0817    --  butalbital-acetaminophen-caffeine (FIORICET, ESGIC) -40 MG per tablet  Every 4 Hours PRN      02/18/21 0511    Signed and Held  Transfer Patient  Once      Signed and Held    Signed and Held  Code Status and Medical Interventions:  Continuous      Signed and Held    Signed and Held  Vital Signs Per hospital policy  Per Hospital Policy      Signed and Held    Signed and Held  Up Ad Kourtney  Until Discontinued      Signed and Held    Signed and Held  Ambulate Patient  Every Shift      Signed and Held     Signed and Held  Fundal and Lochia Check  Per Hospital Policy     Comments: Q 15 min x 4, Q 30 min x 2, then Q Shift    Signed and Held    Signed and Held  RN to Assess Rh Status & Place RhIG Evaluation Order if Indicated  Continuous      Signed and Held    Signed and Held  Bladder Assessment  Per Order Details     Comments: Postpartum 1) Upon Admission to Unit & Every 4 Hours PRN Until Voiding. 2) Out of Bed to Void in 8 Hours.    Signed and Held    Signed and Held  Straight Cath  Per Order Details     Comments: Postpartum: If Distended & Unable to Void, May Repeat Once.    Signed and Held    Signed and Held  Indwelling Urinary Catheter  Per Order Details     Comments: Postpartum : After Straight Cathed x2 or if Greater Than 1000mL Residual, Insert Indwelling Urinary Catheter Until Further MD Order.    Signed and Held    Signed and Held  Apply Ice to Perineum  As Needed     Comments: For 20 min q 2 hrs    Signed and Held    Signed and Held  Waffle Cushion  As Needed     Comments: For perineal discomfort    Signed and Held    Signed and Held  Donut Ring  As Needed     Comments: For perineal pain    Signed and Held    Signed and Held  Sitz Bath  3 Times Daily     Comments: PRN    Signed and Held    Signed and Held  Kpad  As Needed     Comments: For pain    Signed and Held    Signed and Held  Warm compress  As Needed      Signed and Held    Signed and Held  Breast pump to bed  Once      Signed and Held    Signed and Held  Apply ace wrap, tight bra, or binder  As Needed      Signed and Held    Signed and Held  Apply ice packs  As Needed      Signed and Held    Signed and Held  Notify Physician  Until Discontinued      Signed and Held    Signed and Held  Diet Regular  Diet Effective Now      Signed and Held    Signed and Held  Advance Diet as Tolerated  Until Discontinued      Signed and Held    Signed and Held  If indicated -- Please administer RH Immunoglobulin based on results of cord blood evaluation and fetal  screen lab tests, pharmacy to dispense  Per Order Details     Comments: See Process Instructions For Reference Range Details.    Signed and Held    Signed and Held  CBC & Differential  Timed     Comments: Postpartum Day 1      Signed and Held    Signed and Held  sodium chloride 0.9 % flush 1-10 mL  As Needed      Signed and Held    Signed and Held  ibuprofen (ADVIL,MOTRIN) tablet 600 mg  Every 6 Hours PRN      Signed and Held    Signed and Held  bisacodyl (DULCOLAX) suppository 10 mg  Daily PRN      Signed and Held    Signed and Held  magnesium hydroxide (MILK OF MAGNESIA) suspension 2400 mg/10mL 10 mL  Daily PRN      Signed and Held    Signed and Held  docusate sodium (COLACE) capsule 100 mg  2 Times Daily      Signed and Held    Signed and Held  ondansetron (ZOFRAN) injection 4 mg  Every 6 Hours PRN      Signed and Held    Signed and Held  lanolin cream  Every 1 Hour PRN      Signed and Held    Signed and Held  benzocaine-menthol (DERMOPLAST) 20-0.5 % topical spray  As Needed      Signed and Held    Signed and Held  witch hazel-glycerin (TUCKS) pad 1 pad  As Needed      Signed and Held    Signed and Held  Hydrocortisone (Perianal) (ANUSOL-HC) 2.5 % rectal cream 1 application  As Needed      Signed and Held    Signed and Held  benzocaine (AMERICAINE) 20 % rectal ointment 1 application  As Needed      Signed and Held    Signed and Held  prenatal vitamin tablet 1 tablet  Daily      Signed and Held                   Operative/Procedure Notes (last 24 hours) (Notes from 02/17/21 0945 through 02/18/21 0945)      Luann Sanchez CNM at 02/18/21 0832          [facility]  Vaginal Delivery Note    Delivery     Delivery: Vaginal, Spontaneous     YOB: 2021    Time of Birth:  Gestational Age 8:15 AM   39w5d     Anesthesia: None     Delivering clinician: Luann Sanchez    Forceps?   No   Vacuum? No    Shoulder dystocia present: No        Delivery narrative:  Patient at 39w5d pushed to deliver a viable male  infant over an intact perineum without anesthesia. Infant's head delivered atraumatically and a tight nuchal cord x1 was found. The anterior shoulder and body delivered atraumatically through the cord. Vigorous infant was placed on mom's abdomen where the cord was clamped x2 and cut by me after a 3-minute delay. Placenta delivered spontaneously and appeared to be intact. Perineum was inspected and a periurethral abrasion was found. The abrasion was not repaired. EBL 100ml. Mother and infant stable, bonding.         Infant    Findings: male  infant     Infant observations: Weight: 3510 g (7 lb 11.8 oz)   Length: 20.5  in  Observations/Comments:        Apgars:  8 @ 1 minute /     9 @ 5 minutes   Infant Name: Matt     Placenta, Cord, and Fluid    Placenta delivered  Spontaneous  at   2/18/2021  8:21 AM     Cord: 3 vessels  present.   Nuchal Cord?  yes; Number of nuchal loops present:  1    Cord blood obtained: Yes    Cord gases obtained:  No              Repair    Episiotomy: None         Lacerations: Yes  Laceration Information  Laceration Repaired?   Perineal: None      Periurethral:  Abrasion  No   Labial:       Sulcus:       Vaginal:       Cervical:            Estimated Blood Loss: Est. Blood Loss (mL): 100 mL(Filed from Delivery Summary) (02/18/21 0815)           Complications  none    Disposition  Mother to Mother Baby/Postpartum  in stable condition currently.  Baby to remains with mom  in stable condition currently.      Luann Sanchez CNM  02/18/21  08:36 EST        Electronically signed by Luann Sanchez CNM at 02/18/21 0837       Physician Progress Notes (last 24 hours) (Notes from 02/17/21 0945 through 02/18/21 0945)    No notes of this type exist for this encounter.

## 2021-02-18 NOTE — L&D DELIVERY NOTE
[facility]  Vaginal Delivery Note    Delivery     Delivery: Vaginal, Spontaneous     YOB: 2021    Time of Birth:  Gestational Age 8:15 AM   39w5d     Anesthesia: None     Delivering clinician: Luann Sanchez    Forceps?   No   Vacuum? No    Shoulder dystocia present: No        Delivery narrative:  Patient at 39w5d pushed to deliver a viable male infant over an intact perineum without anesthesia. Infant's head delivered atraumatically and a tight nuchal cord x1 was found. The anterior shoulder and body delivered atraumatically through the cord. Vigorous infant was placed on mom's abdomen where the cord was clamped x2 and cut by me after a 3-minute delay. Placenta delivered spontaneously and appeared to be intact. Perineum was inspected and a periurethral abrasion was found. The abrasion was not repaired. EBL 100ml. Mother and infant stable, bonding.         Infant    Findings: male  infant     Infant observations: Weight: 3510 g (7 lb 11.8 oz)   Length: 20.5  in  Observations/Comments:        Apgars:  8 @ 1 minute /     9 @ 5 minutes   Infant Name: [place]     Placenta, Cord, and Fluid    Placenta delivered  Spontaneous  at   2/18/2021  8:21 AM     Cord: 3 vessels  present.   Nuchal Cord?  yes; Number of nuchal loops present:  1    Cord blood obtained: Yes    Cord gases obtained:  No              Repair    Episiotomy: None         Lacerations: Yes  Laceration Information  Laceration Repaired?   Perineal: None      Periurethral:  Abrasion  No   Labial:       Sulcus:       Vaginal:       Cervical:            Estimated Blood Loss: Est. Blood Loss (mL): 100 mL(Filed from Delivery Summary) (02/18/21 0815)           Complications  none    Disposition  Mother to Mother Baby/Postpartum  in stable condition currently.  Baby to remains with mom  in stable condition currently.      Luann Sanchez CNM  02/18/21  08:36 EST

## 2021-02-18 NOTE — H&P
[facility]  Obstetric History and Physical    Chief Complaint   Patient presents with   • Laboring       Subjective     Patient is a 30 y.o. female  currently at 39w5d, who presents with labor and SROM.    Her prenatal care is benign.  Her one hour GCT was elevated however 3 hour GTT was WNL.  Her previous obstetric/gynecological history is noted for is non-contributory.    The following portions of the patients history were reviewed and updated as appropriate: current medications, allergies, past medical history, past surgical history, past family history, past social history and problem list .       Prenatal Information:  Prenatal Results     POC Urine Glucose/Protein     Test Value Reference Range Date Time    Urine Glucose        Urine Protein              Initial Prenatal Labs     Test Value Reference Range Date Time    Hemoglobin 13.9 g/dL 12.0 - 15.9 20 1117    Hematocrit 41.6 % 34.0 - 46.6 20 1117    Platelets 218 10*3/mm3 140 - 450 21 0522      194 10*3/mm3 140 - 450 20 1007      212 10*3/mm3 140 - 450 20 1117    Rubella IgG Positive   20 1117    Hepatitis B SAg Non-Reactive  Non-Reactive 20 1117    Hepatitis C Ab Non-Reactive  Non-Reactive 20 1117    RPR Non-Reactive  Non-Reactive 20 1117    ABO A   21 0544    Rh Negative   21 0544    Antibody Screen Negative   20 111    HIV Non-Reactive  Non-Reactive 20 1117    Urine Culture        Gonorrhea        Chlamydia        TSH 0.914 uIU/mL 0.270 - 4.200 20 1117          2nd and 3rd Trimester     Test Value Reference Range Date Time    Hemoglobin (repeated) 14.1 g/dL 12.0 - 15.9 21 0522      12.3 g/dL 12.0 - 15.9 20 1007    Hematocrit (repeated) 42.1 % 34.0 - 46.6 21 0522      36.9 % 34.0 - 46.6 20 1007     mg/dL 74 - 180 20 1049      169 mg/dL 65 - 140 20 1007    Antibody Screen (repeated) Negative   21 0544      Negative    12/03/20 1007    GTT Fasting 96 mg/dL  02/01/19     GTT 1 Hr        GTT 2 Hr        GTT 3 Hr        Group B Strep              Drug Screening     Test Value Reference Range Date Time    Amphetamine Screen Negative  Negative 08/20/20 1117    Barbiturate Screen Negative  Negative 08/20/20 1117    Benzodiazepine Screen Negative  Negative 08/20/20 1117    Methadone Screen Negative  Negative 08/20/20 1117    Phencyclidine Screen Negative  Negative 08/20/20 1117    Opiates Screen Negative  Negative 08/20/20 1117    THC Screen Negative  Negative 08/20/20 1117    Cocaine Screen Negative  Negative 08/20/20 1117    Propoxyphene Screen Negative  Negative 08/20/20 1117    Buprenorphine Screen Negative  Negative 08/20/20 1117    Methamphetamine Screen Negative  Negative 08/20/20 1117    Oxycodone Screen Negative  Negative 08/20/20 1117    Tricyclic Antidepressants Screen Negative  Negative 08/20/20 1117          Other (Risk screening)     Test Value Reference Range Date Time    Varicella IgG        Parvovirus IgG        CMV IgG        Cystic Fibrosis        Hemoglobin electrophoresis        NIPT        MSAFP-4        AFP (for NTD only)                  External Prenatal Results     Pregnancy Outside Results - Transcribed From Office Records - See Scanned Records For Details     Test Value Date Time    Hgb 14.1 g/dL 02/18/21 0522      12.3 g/dL 12/03/20 1007      13.9 g/dL 08/20/20 1117    Hct 42.1 % 02/18/21 0522      36.9 % 12/03/20 1007      41.6 % 08/20/20 1117    ABO A  02/18/21 0544    Rh Negative  02/18/21 0544    Antibody Screen Negative  02/18/21 0544      Negative  12/03/20 1007      Negative  08/20/20 1117    Glucose Fasting GTT 96 mg/dL 02/01/19     Glucose Tolerance Test 1 hour       Glucose Tolerance Test 3 hour       Gonorrhea (discrete)       Chlamydia (discrete)       RPR Non-Reactive  08/20/20 1117    VDRL       Syphilis Antibody       Rubella Positive  08/20/20 1117    HBsAg Non-Reactive  08/20/20 1117     Herpes Simplex Virus PCR       Herpes Simplex VIrus Culture       HIV Non-Reactive  20 1117    Hep C RNA Quant PCR       Hep C Antibody Non-Reactive  20 111    AFP       Group B Strep       GBS Susceptibility to Clindamycin       GBS Susceptibility to Erythromycin       Fetal Fibronectin       Genetic Testing, Maternal Blood             Drug Screening     Test Value Date Time    Urine Drug Screen       Amphetamine Screen Negative  20 1117    Barbiturate Screen Negative  20 1117    Benzodiazepine Screen Negative  20 111    Methadone Screen Negative  20 111    Phencyclidine Screen Negative  20 111    Opiates Screen Negative  20 1117    THC Screen Negative  20 111    Cocaine Screen       Propoxyphene Screen Negative  20 111    Buprenorphine Screen Negative  20 111    Methamphetamine Screen       Oxycodone Screen Negative  20 111    Tricyclic Antidepressants Screen Negative  20 111                 Past OB History:     OB History    Para Term  AB Living   3 2 2 0 0 2   SAB TAB Ectopic Molar Multiple Live Births   0 0 0 0 0 2      # Outcome Date GA Lbr Rikki/2nd Weight Sex Delivery Anes PTL Lv   3 Current            2 Term 19 39w5d / 00:20 3215 g (7 lb 1.4 oz) F Vag-Spont None N RENEA      Name: JUSTIN PIKE      Apgar1: 9  Apgar5: 9   1 Term 13 40w0d  3232 g (7 lb 2 oz) M Vag-Spont None  RENEA       Past Medical History: Past Medical History:   Diagnosis Date   • Anxiety    • Hypoglycemia       Past Surgical History Past Surgical History:   Procedure Laterality Date   • CYST REMOVAL     • WISDOM TOOTH EXTRACTION        Family History: No family history on file.   Social History:  reports that she has never smoked. She has never used smokeless tobacco.   reports no history of alcohol use.   reports no history of drug use.        Review of Systems   Constitutional: Negative.    HENT: Negative.    Eyes:  Negative.    Respiratory: Negative.    Cardiovascular: Negative.    Gastrointestinal: Negative.    Endocrine: Negative.    Genitourinary: Negative.    Musculoskeletal: Negative.    Skin: Negative.    Allergic/Immunologic: Negative.    Neurological: Negative.    Hematological: Negative.    Psychiatric/Behavioral: Negative.          Objective     Vital Signs Range for the last 24 hours  Temperature: Temp:  [97.7 °F (36.5 °C)-97.8 °F (36.6 °C)] 97.7 °F (36.5 °C)   Temp Source: Temp src: Oral   BP: BP: (116-118)/(74-80) 116/80   Pulse: Heart Rate:  [80-96] 96   Respirations: Resp:  [20] 20   SPO2:     O2 Amount (l/min):     O2 Devices     Weight: Weight:  [92.1 kg (203 lb)] 92.1 kg (203 lb)     Physical Examination: General appearance - alert, well appearing, and in no distress  Neck - supple, no significant adenopathy  Chest - clear to auscultation, no wheezes, rales or rhonchi, symmetric air entry  Heart - normal rate, regular rhythm, normal S1, S2, no murmurs, rubs, clicks or gallops  Abdomen - soft, nontender, nondistended, no masses or organomegaly  Pelvic - normal external genitalia, vulva, vagina, cervix, uterus and adnexa  Extremities - peripheral pulses normal, no pedal edema, no clubbing or cyanosis    Presentation: vertex   Cervix: Exam by:   GUANAKITO   Dilation: Cervical Dilation (cm): 8   Effacement: Cervical Effacement: 70%   Station: Fetal Station: 1-->-2     Fetal Heart Rate Assessment   Method: Fetal HR Assessment Method: external   Beats/min: Fetal HR (beats/min): 135   Baseline: Fetal Heart Baseline Rate: normal range   Variability: Fetal HR Variability: moderate (amplitude range 6 to 25 bpm)   Accels: Fetal HR Accelerations: greater than/equal to 15 bpm, lasting at least 15 seconds   Decels: Fetal HR Decelerations: variable   Tracing Category:       Uterine Assessment   Method: Method: external tocotransducer   Frequency (min): Contraction Frequency (Minutes): 2-3   Ctx Count in 10 min:     Duration:      Intensity: Contraction Intensity: strong by palpation   Intensity by IUPC:     Resting Tone: Uterine Resting Tone: soft by palpation   Resting Tone by IUPC:     Luis Felipe Units:       Assessment/Plan       39 weeks gestation of pregnancy    Group B streptococcal infection during pregnancy      Assessment & Plan    Assessment:  1.  Intrauterine pregnancy at 39w5d gestation with reactive fetal status.    2.  labor  with ROM  3.  Obstetrical history significant for is non-contributory.  4.  GBS status: Positive    Plan:  1. Admit for labor management, delivery and post partum care.  Patient desires unmedicated labor and birth.  Continue IV penicillin for maternal GBS infection.  Plan AROM of amniotic forebag after second dose of PCN administered.  Will otherwise proceed with expectant management  2. Plan of care has been reviewed with patient and she verbalizes understanding  3.  Risks, benefits of treatment plan have been discussed.  4.  All questions have been answered.  5.  Patient aware that pain management options are available to her if she desires.        Trang Richards CNM  2/18/2021  07:53 EST

## 2021-02-19 LAB
BASOPHILS # BLD AUTO: 0.03 10*3/MM3 (ref 0–0.2)
BASOPHILS NFR BLD AUTO: 0.3 % (ref 0–1.5)
DEPRECATED RDW RBC AUTO: 43.7 FL (ref 37–54)
EOSINOPHIL # BLD AUTO: 0.1 10*3/MM3 (ref 0–0.4)
EOSINOPHIL NFR BLD AUTO: 0.9 % (ref 0.3–6.2)
ERYTHROCYTE [DISTWIDTH] IN BLOOD BY AUTOMATED COUNT: 13.4 % (ref 12.3–15.4)
HCT VFR BLD AUTO: 39.5 % (ref 34–46.6)
HGB BLD-MCNC: 12.9 G/DL (ref 12–15.9)
IMM GRANULOCYTES # BLD AUTO: 0.08 10*3/MM3 (ref 0–0.05)
IMM GRANULOCYTES NFR BLD AUTO: 0.7 % (ref 0–0.5)
LYMPHOCYTES # BLD AUTO: 2.09 10*3/MM3 (ref 0.7–3.1)
LYMPHOCYTES NFR BLD AUTO: 19.1 % (ref 19.6–45.3)
MCH RBC QN AUTO: 29.3 PG (ref 26.6–33)
MCHC RBC AUTO-ENTMCNC: 32.7 G/DL (ref 31.5–35.7)
MCV RBC AUTO: 89.8 FL (ref 79–97)
MONOCYTES # BLD AUTO: 0.53 10*3/MM3 (ref 0.1–0.9)
MONOCYTES NFR BLD AUTO: 4.8 % (ref 5–12)
NEUTROPHILS NFR BLD AUTO: 74.2 % (ref 42.7–76)
NEUTROPHILS NFR BLD AUTO: 8.14 10*3/MM3 (ref 1.7–7)
NRBC BLD AUTO-RTO: 0 /100 WBC (ref 0–0.2)
PLATELET # BLD AUTO: 182 10*3/MM3 (ref 140–450)
PMV BLD AUTO: 10.2 FL (ref 6–12)
RBC # BLD AUTO: 4.4 10*6/MM3 (ref 3.77–5.28)
WBC # BLD AUTO: 10.97 10*3/MM3 (ref 3.4–10.8)

## 2021-02-19 PROCEDURE — 85025 COMPLETE CBC W/AUTO DIFF WBC: CPT | Performed by: ADVANCED PRACTICE MIDWIFE

## 2021-02-19 PROCEDURE — 25010000002 RHO D IMMUNE GLOBULIN 1500 UNIT/2ML SOLUTION PREFILLED SYRINGE: Performed by: NURSE PRACTITIONER

## 2021-02-19 RX ADMIN — IBUPROFEN 600 MG: 600 TABLET ORAL at 02:59

## 2021-02-19 RX ADMIN — HUMAN RHO(D) IMMUNE GLOBULIN 1500 UNITS: 1500 SOLUTION INTRAMUSCULAR; INTRAVENOUS at 06:28

## 2021-02-19 RX ADMIN — IBUPROFEN 600 MG: 600 TABLET ORAL at 09:00

## 2021-02-19 RX ADMIN — IBUPROFEN 600 MG: 600 TABLET ORAL at 12:37

## 2021-02-19 RX ADMIN — PRENATAL VITAMINS-IRON FUMARATE 27 MG IRON-FOLIC ACID 0.8 MG TABLET 1 TABLET: at 09:10

## 2021-02-19 RX ADMIN — IBUPROFEN 600 MG: 600 TABLET ORAL at 19:21

## 2021-02-19 NOTE — PROGRESS NOTES
[facility]  Vaginal Delivery Progress Note    Subjective     Doing well. Tolerating regular diet. Voiding without difficulty. Pain controlled. Reports decreasing lochia. Ambulating. VSS. Afebrile. PP h/h stable.       Objective     Vital Signs Range for the last 24 hours  Temperature: Temp:  [98.3 °F (36.8 °C)-98.4 °F (36.9 °C)] 98.4 °F (36.9 °C)   Temp Source: Temp src: Oral   BP: BP: (116-135)/(68-80) 135/80   Pulse: Heart Rate:  [87-90] 88   Respirations: Resp:  [16-18] 18   SPO2:     O2 Amount (l/min):     O2 Devices           Physical Exam:  General:  no acute distresss.  Abdomen: Soft, non-tender, fundus firm  Extremities: normal, atraumatic, no cyanosis, and trace edema.       Lab results reviewed:  Yes    Lab Results   Component Value Date    WBC 10.97 (H) 2021    HGB 12.9 2021    HCT 39.5 2021    MCV 89.8 2021     2021         Assessment/Plan       Group B streptococcal infection during pregnancy     (normal spontaneous vaginal delivery)      Quita Millan is Day 1  post-partum       Plan:  Continue current care.      Kailyn Diana CNM  2021  12:15 EST

## 2021-02-20 VITALS
SYSTOLIC BLOOD PRESSURE: 123 MMHG | RESPIRATION RATE: 16 BRPM | TEMPERATURE: 98.5 F | HEIGHT: 65 IN | DIASTOLIC BLOOD PRESSURE: 75 MMHG | BODY MASS INDEX: 33.82 KG/M2 | HEART RATE: 92 BPM | WEIGHT: 203 LBS

## 2021-02-20 RX ORDER — IBUPROFEN 600 MG/1
600 TABLET ORAL EVERY 6 HOURS PRN
Qty: 60 TABLET | Refills: 1 | Status: SHIPPED | OUTPATIENT
Start: 2021-02-20

## 2021-02-20 RX ADMIN — PRENATAL VITAMINS-IRON FUMARATE 27 MG IRON-FOLIC ACID 0.8 MG TABLET 1 TABLET: at 09:42

## 2021-02-20 NOTE — DISCHARGE SUMMARY
[facility]  Delivery Discharge Summary    Primary OB Clinician:     EDC: Estimated Date of Delivery: 21    Gestational Age:39w5d    Date of Admission: 2021    Admission Diagnosis:      Discharge Diagnosis: Same    Date of Delivery: 2021   Time of Delivery: 8:15 AM     Delivered By:  Luann Sanchez     Delivery Type: Vaginal, Spontaneous          Baby:male  infant;   Apgar:  8  @ 1 minute /   Apgar:  9  @ 5 minutes   Weight: 3510 g (7 lb 11.8 oz)    Length: 20.5     Anesthesia: None      Laceration: No    Exam:  Normal postpartum exam    Hospital Course:  Hospital course has been stable.  Patient is tolerating po, voiding without difficulty and ready for discharge.  Please see medication reconciliation and lab report for additional details.      Follow Up:  Patient has been given a follow up appointment in our office.     Discharge Date: 2021; Discharge Time: 11:26 EST    Kailyn Diana CNM   11:26 EST   21

## 2021-02-20 NOTE — PROGRESS NOTES
[facility]  Vaginal Delivery Progress Note    Subjective     Doing well today. Continues to tolerate regular diet. Voiding without difficulty. Decreasing lochia. Pain controlled. Ambulating. VSS. Afebrile. Ready for discharge home today.       Objective     Vital Signs Range for the last 24 hours  Temperature: Temp:  [98 °F (36.7 °C)-98.7 °F (37.1 °C)] 98.5 °F (36.9 °C)   Temp Source: Temp src: Oral   BP: BP: (113-127)/(58-75) 123/75   Pulse: Heart Rate:  [87-92] 92   Respirations: Resp:  [16-18] 16   SPO2:     O2 Amount (l/min):     O2 Devices           Physical Exam:  General:  no acute distresss.  Abdomen: Soft, non-tender, fundus firm  Extremities: normal, atraumatic, no cyanosis, and trace edema.       Lab results reviewed:  Yes    Lab Results   Component Value Date    WBC 10.97 (H) 2021    HGB 12.9 2021    HCT 39.5 2021    MCV 89.8 2021     2021         Assessment/Plan       Group B streptococcal infection during pregnancy     (normal spontaneous vaginal delivery)      Quita Millan is Day 2  post-partum       Plan:  Discharge home with standard precautions and return to clinic in 4-6 weeks.      Kailyn Diana CNM  2021  11:22 EST

## 2021-02-22 NOTE — PAYOR COMM NOTE
"Louisa Pike (30 y.o. Female)     Auth#[id number]    Discharged 2/20/21 with Baby.    From: Nathalie Spears  #[phone]  Fax#[phone]      Date of Birth Social Security Number Address Home Phone MRN    1990  [address] [place] [address] [phone] [id number]    Sabianist Marital Status          None Single       Admission Date Admission Type Admitting Provider Attending Provider Department, Room/Bed    2/18/21 Elective Samaria Estevez MD  [facility] MOTHER BABY 4B, N428/1    Discharge Date Discharge Disposition Discharge Destination        2/20/2021 Home or Self Care              Attending Provider: (none)   Allergies: Coconut, Sulfa Antibiotics    Isolation: None   Infection: None   Code Status: Prior    Ht: 165.1 cm (65\")   Wt: 92.1 kg (203 lb)    Admission Cmt: None   Principal Problem: None                Active Insurance as of 2/18/2021     Primary Coverage     Payor Plan Insurance Group Employer/Plan Group    [employer] MEDICAID [facility] MEDICAID [id number]     Payor Plan Address Payor Plan Phone Number Payor Plan Fax Number Effective Dates    [address] [address] [phone]  7/1/2020 - None Entered    [facility] [id number]       Subscriber Name Subscriber Birth Date Member ID       LOUISA PIKE 1990 [id number]                 Emergency Contacts      (Rel.) Home Phone Work Phone Mobile Phone    Efrain Shelton (Significant Other) -- -- [phone]               Discharge Summary      Kailyn Diana CNM at 02/20/21 1126     Attestation signed by Sandra Ash MD at 02/20/21 1234    Reviewed and agree.                  [facility]  Delivery Discharge Summary    Primary OB Clinician:     EDC: Estimated Date of Delivery: 2/20/21    Gestational Age:39w5d    Date of Admission: 2/18/2021    Admission Diagnosis:      Discharge Diagnosis: Same    Date of Delivery: 2/18/2021   Time of Delivery: 8:15 AM     Delivered By:  Luann VERDUZCO" Daniel     Delivery Type: Vaginal, Spontaneous          Baby:male  infant;   Apgar:  8  @ 1 minute /   Apgar:  9  @ 5 minutes   Weight: 3510 g (7 lb 11.8 oz)    Length: 20.5     Anesthesia: None      Laceration: No    Exam:  Normal postpartum exam    Hospital Course:  Hospital course has been stable.  Patient is tolerating po, voiding without difficulty and ready for discharge.  Please see medication reconciliation and lab report for additional details.      Follow Up:  Patient has been given a follow up appointment in our office.     Discharge Date: 2021; Discharge Time: 11:26 EST    Kaiyln Diana CNM   11:26 EST   21         Electronically signed by Sandra Ash MD at 21 [phone]

## 2023-09-03 ENCOUNTER — HOSPITAL ENCOUNTER (EMERGENCY)
Facility: HOSPITAL | Age: 33
Discharge: HOME | End: 2023-09-03
Attending: EMERGENCY MEDICINE | Admitting: EMERGENCY MEDICINE
Payer: COMMERCIAL

## 2023-09-03 VITALS
SYSTOLIC BLOOD PRESSURE: 148 MMHG | DIASTOLIC BLOOD PRESSURE: 95 MMHG | OXYGEN SATURATION: 100 % | HEIGHT: 65 IN | WEIGHT: 162 LBS | BODY MASS INDEX: 26.99 KG/M2 | RESPIRATION RATE: 16 BRPM | TEMPERATURE: 98.8 F | HEART RATE: 67 BPM
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
## 2023-09-03 DIAGNOSIS — L03.114 CELLULITIS OF LEFT UPPER EXTREMITY: ICD-10-CM

## 2023-09-03 PROCEDURE — 87147 CULTURE TYPE IMMUNOLOGIC: CPT | Performed by: PHYSICIAN ASSISTANT

## 2023-09-03 PROCEDURE — 87070 CULTURE OTHR SPECIMN AEROBIC: CPT | Performed by: PHYSICIAN ASSISTANT

## 2023-09-03 PROCEDURE — 87205 SMEAR GRAM STAIN: CPT | Performed by: PHYSICIAN ASSISTANT

## 2023-09-03 PROCEDURE — 99283 EMERGENCY DEPT VISIT LOW MDM: CPT

## 2023-09-03 RX ORDER — ACETAMINOPHEN 500 MG
200 TABLET ORAL EVERY 6 HOURS PRN
COMMUNITY

## 2023-09-03 RX ORDER — AMOXICILLIN AND CLAVULANATE POTASSIUM 875; 125 MG/1; MG/1
1 TABLET, FILM COATED ORAL ONCE
Status: COMPLETED | OUTPATIENT
Start: 2023-09-03 | End: 2023-09-03

## 2023-09-03 RX ORDER — AMOXICILLIN AND CLAVULANATE POTASSIUM 875; 125 MG/1; MG/1
1 TABLET, FILM COATED ORAL 2 TIMES DAILY
Qty: 20 TABLET | Refills: 0 | Status: SHIPPED | OUTPATIENT
Start: 2023-09-03

## 2023-09-03 RX ADMIN — AMOXICILLIN AND CLAVULANATE POTASSIUM 1 TABLET: 875; 125 TABLET, FILM COATED ORAL at 18:31

## 2023-09-03 NOTE — ED PROVIDER NOTES
Subjective   History of Present Illness  32-year-old female presents emergency department today with an infection in her left thumb.  She reports she is fostering 4 kittens and states that 1 in the middle of the night jumped on her hand and she is not sure if it scratched or bit her playfully.  She is got a pustule on her left thumb and states she had a red streak her on the day which seems to have resolved.  She has had no fevers no chills.  She is not immunocompromise.  She has no other complaints.  The animals have had their shots.    History provided by:  Patient   used: No    Arm Pain  Location:  Left arm and thumb  Quality:  Burning  Severity:  Moderate  Onset quality:  Gradual  Duration:  2 days  Timing:  Constant  Progression:  Worsening  Chronicity:  New  Context:  Fostering several small cats one on either bit or scratched her in the middle of the night.  Relieved by:  Immobilization  Worsened by:  Movement  Associated symptoms: rash    Associated symptoms: no abdominal pain, no chest pain, no congestion, no cough, no diarrhea, no fatigue, no fever, no headaches, no loss of consciousness, no myalgias, no nausea, no rhinorrhea, no shortness of breath, no vomiting and no wheezing      Review of Systems   Constitutional:  Negative for fatigue and fever.   HENT:  Negative for congestion and rhinorrhea.    Respiratory:  Negative for cough, shortness of breath and wheezing.    Cardiovascular:  Negative for chest pain.   Gastrointestinal:  Negative for abdominal pain, diarrhea, nausea and vomiting.   Musculoskeletal:  Negative for myalgias.   Skin:  Positive for rash.   Neurological:  Negative for loss of consciousness and headaches.   All other systems reviewed and are negative.    Past Medical History:   Diagnosis Date    Anxiety     Hypoglycemia        Allergies   Allergen Reactions    Coconut Anaphylaxis    Sulfa Antibiotics Anaphylaxis       Past Surgical History:   Procedure Laterality  Date    CYST REMOVAL      WISDOM TOOTH EXTRACTION         History reviewed. No pertinent family history.    Social History     Socioeconomic History    Marital status: Single   Tobacco Use    Smoking status: Never     Passive exposure: Never    Smokeless tobacco: Never   Vaping Use    Vaping Use: Never used   Substance and Sexual Activity    Alcohol use: No    Drug use: No    Sexual activity: Defer           Objective   Physical Exam  Vitals and nursing note reviewed.   Constitutional:       General: She is not in acute distress.     Appearance: She is well-developed. She is not diaphoretic.   HENT:      Head: Normocephalic and atraumatic.      Nose: Nose normal.   Eyes:      General: No scleral icterus.     Conjunctiva/sclera: Conjunctivae normal.   Cardiovascular:      Rate and Rhythm: Normal rate.   Pulmonary:      Effort: Pulmonary effort is normal. No respiratory distress.   Musculoskeletal:         General: Normal range of motion.      Cervical back: Normal range of motion and neck supple.   Skin:     General: Skin is warm and dry.      Comments: Left thumb dorsal aspect has a small pustule.  This was opened and wound culture obtained.  She showed me a picture of her arm earlier the day what appeared to have red streaks however looking at her arm now there is really not evidence of red streaks.  Does have some regional lymphadenitis.  Left epitrochlear nodes are tender but no redness.   Neurological:      Mental Status: She is alert and oriented to person, place, and time.   Psychiatric:         Behavior: Behavior normal.       Incision & Drainage    Date/Time: 9/3/2023 6:35 PM  Performed by: Jermain Page PA  Authorized by: Guillermo Morris MD     Consent:     Consent obtained:  Verbal    Consent given by:  Patient    Risks, benefits, and alternatives were discussed: yes      Risks discussed:  Bleeding, incomplete drainage, pain, infection and damage to other organs    Alternatives discussed:  No  treatment and referral  Universal protocol:     Procedure explained and questions answered to patient or proxy's satisfaction: yes      Relevant documents present and verified: yes      Test results available : yes      Imaging studies available: yes      Required blood products, implants, devices, and special equipment available: yes      Site/side marked: yes      Immediately prior to procedure, a time out was called: yes      Patient identity confirmed:  Verbally with patient and arm band  Location:     Indications for incision and drainage: Pustule left dorsal thumb.    Size:  0.5    Location:  Upper extremity    Upper extremity location:  Hand    Hand location:  L hand  Pre-procedure details:     Skin preparation:  Chlorhexidine with alcohol  Sedation:     Sedation type:  None  Anesthesia:     Anesthesia method:  None  Procedure type:     Complexity:  Simple  Procedure details:     Ultrasound guidance: no      Needle aspiration: yes      Needle size:  18 G    Incision depth:  Subcutaneous    Wound management:  Irrigated with saline    Drainage:  Purulent    Drainage amount:  Scant    Wound treatment:  Wound left open    Packing materials:  None  Post-procedure details:     Procedure completion:  Tolerated well, no immediate complications           ED Course                                 No results found for this or any previous visit (from the past 24 hour(s)).  Note: In addition to lab results from this visit, the labs listed above may include labs taken at another facility or during a different encounter within the last 24 hours. Please correlate lab times with ED admission and discharge times for further clarification of the services performed during this visit.    No orders to display     Vitals:    09/03/23 1704 09/03/23 1842   BP: 148/95    BP Location: Left arm    Patient Position: Sitting    Pulse: 75 67   Resp: 18 16   Temp: 98.8 °F (37.1 °C)    TempSrc: Oral    SpO2: 98% 100%   Weight: 73.5 kg (162  "lb)    Height: 165.1 cm (65\")      Medications   amoxicillin-clavulanate (AUGMENTIN) 875-125 MG per tablet 1 tablet (1 tablet Oral Given 9/3/23 1831)     ECG/EMG Results (last 24 hours)       ** No results found for the last 24 hours. **          No orders to display                 Medical Decision Making  Unclear whether this is an animal bite or scratch.  With that having some red streaks bite would be more concerning at this point so started on Augmentin first dose given here prescription sent to pharmacy that we know is open so she can get these through the weekend.  She can elevate the hand above the level of the heart if it gets worse she will return to the emergency department the pustule was opened wound culture was obtained.    Problems Addressed:  Cat bite, initial encounter: complicated acute illness or injury  Cellulitis of left upper extremity: complicated acute illness or injury    Amount and/or Complexity of Data Reviewed  Labs: ordered.    Risk  Prescription drug management.        Final diagnoses:   Cat bite, initial encounter   Cellulitis of left upper extremity       ED Disposition  ED Disposition       ED Disposition   Discharge    Condition   Stable    Comment   --               [facility] EMERGENCY DEPARTMENT  [address]  [facility] [address] [phone]    If symptoms worsen         Medication List        New Prescriptions      amoxicillin-clavulanate 875-125 MG per tablet  Commonly known as: AUGMENTIN  Take 1 tablet by mouth 2 (Two) Times a Day.               Where to Get Your Medications        These medications were sent to [id number] - [place], [place] - [address] [place] [facility] [address] - [phone] [facility] [phone]   [address] [facility] [address]      Phone: [phone]   amoxicillin-clavulanate 875-125 MG per tablet            Jermain Page PA  09/05/23 1910    "

## 2023-09-03 NOTE — Clinical Note
[facility] EMERGENCY DEPARTMENT  [address]  [facility] [address]  Phone: [phone]    Quita Millan was seen and treated in our emergency department on 9/3/2023.  She may return to work on 09/06/2023.         Thank you for choosing [facility].    Jermain Page PA

## 2023-09-03 NOTE — DISCHARGE INSTRUCTIONS
Elevate extremity above the level of the heart.  Return to the emergency department tomorrow if worse.  Take antibiotics as written.

## 2023-09-03 NOTE — Clinical Note
[facility] EMERGENCY DEPARTMENT  [address]  [facility] [address]  Phone: [phone]    Quita Millan was seen and treated in our emergency department on 9/3/2023.  She may return to work on 09/06/2023.         Thank you for choosing [facility].    Jermain Page PA

## 2023-09-03 NOTE — Clinical Note
[facility] EMERGENCY DEPARTMENT  [address]  [facility] [address]  Phone: [phone]    Quita Millan was seen and treated in our emergency department on 9/3/2023.  She may return to work on 09/06/2023.         Thank you for choosing [facility].    Jermain Page PA

## 2023-09-05 LAB
BACTERIA SPEC AEROBE CULT: ABNORMAL
BACTERIA SPEC AEROBE CULT: ABNORMAL
GRAM STN SPEC: ABNORMAL
GRAM STN SPEC: ABNORMAL